# Patient Record
Sex: MALE | Race: WHITE | Employment: FULL TIME | URBAN - METROPOLITAN AREA
[De-identification: names, ages, dates, MRNs, and addresses within clinical notes are randomized per-mention and may not be internally consistent; named-entity substitution may affect disease eponyms.]

---

## 2017-03-06 ENCOUNTER — TELEPHONE (OUTPATIENT)
Dept: FAMILY MEDICINE | Facility: CLINIC | Age: 37
End: 2017-03-06

## 2017-03-06 DIAGNOSIS — F90.0 ATTENTION DEFICIT HYPERACTIVITY DISORDER (ADHD), PREDOMINANTLY INATTENTIVE TYPE: ICD-10-CM

## 2017-03-06 RX ORDER — DEXTROAMPHETAMINE SACCHARATE, AMPHETAMINE ASPARTATE, DEXTROAMPHETAMINE SULFATE AND AMPHETAMINE SULFATE 2.5; 2.5; 2.5; 2.5 MG/1; MG/1; MG/1; MG/1
10 TABLET ORAL 2 TIMES DAILY
Qty: 60 TABLET | Refills: 0 | Status: SHIPPED | OUTPATIENT
Start: 2017-03-06 | End: 2017-03-30

## 2017-03-06 NOTE — TELEPHONE ENCOUNTER
Adderall      Last Written Prescription Date:  1/15/17  Last Fill Quantity: 60,   # refills: 0  Last Office Visit with [facility], [facility] or [facility] prescribing provider: 6/8/16  Future Office visit:    Next 5 appointments (look out 90 days)     Mar 13, 2017  2:20 PM CDT   SHORT with Umesh Gruber Jr., MD   [facility] ([facility]    [address] [facility] [address] [phone]                 Routing refill request to provider for review/approval because:  Drug not on the [facility], [facility] or [facility] refill protocol or controlled substance  Joi Herndon RN, BSN  [facility]

## 2017-03-06 NOTE — TELEPHONE ENCOUNTER
The requested prescription(s) has/have been approved and has/have been printed and signed. This note was forwarded to the patient care pool to contact the patient to arrange for the patient to obtain the signed prescription(s).  Jr Umesh Gruber

## 2017-03-06 NOTE — TELEPHONE ENCOUNTER
Reason for Call:  Medication or medication refill:    Do you use a [facility]?  Name of the pharmacy and phone number for the current request:  Written Rx    Name of the medication requested: amphetamine-dextroamphetamine (ADDERALL) 10 MG tablet    Other request: Pt has an appt 3/13/17    Can we leave a detailed message on this number? YES    Phone number patient can be reached at: Home number on file [phone]    Best Time:     Call taken on 3/6/2017 at 2:24 PM by Priya Suarez

## 2017-03-30 DIAGNOSIS — F90.0 ATTENTION DEFICIT HYPERACTIVITY DISORDER (ADHD), PREDOMINANTLY INATTENTIVE TYPE: ICD-10-CM

## 2017-03-30 NOTE — TELEPHONE ENCOUNTER
Controlled Substance Refill Request for adderall--please call [phone] when ready at  for .  Problem List Complete:  Yes    Last Written Prescription Date:  3/6/2017  Last Fill Quantity: 60,   # refills: 0    Last Office Visit with [facility] primary care provider: 6/8/2016    Clinic visit frequency required: Q 6  months     Future Office visit:   Next 5 appointments (look out 90 days)     Apr 11, 2017  4:20 PM CDT   Office Visit with Umesh Gruber Jr., MD   [facility] ([facility])    [address] [id number] [phone]                  Controlled substance agreement on file: Yes:  Date 6/8/2016.     Processing:   at .   checked in past 6 months?

## 2017-03-31 NOTE — TELEPHONE ENCOUNTER
I have pended the requested refill(s).  Please have one of the other MDs print and sign the controlled substance prescription(s) in my absence.     Umesh Gruber MD

## 2017-04-03 RX ORDER — DEXTROAMPHETAMINE SACCHARATE, AMPHETAMINE ASPARTATE, DEXTROAMPHETAMINE SULFATE AND AMPHETAMINE SULFATE 2.5; 2.5; 2.5; 2.5 MG/1; MG/1; MG/1; MG/1
10 TABLET ORAL 2 TIMES DAILY
Qty: 60 TABLET | Refills: 0 | Status: SHIPPED | OUTPATIENT
Start: 2017-04-03 | End: 2017-05-08

## 2017-04-03 NOTE — TELEPHONE ENCOUNTER
Wife called   Wants to know if someone else can sign off on this.   Please call them back at [phone].   Bibi Hampton

## 2017-04-04 NOTE — TELEPHONE ENCOUNTER
Script signed per PCP's request below in his absence. Please notify available for pick-up in TC out-box.  Electronically Signed By: Perla Shipley PA-C

## 2017-04-26 ENCOUNTER — OFFICE VISIT (OUTPATIENT)
Dept: FAMILY MEDICINE | Facility: CLINIC | Age: 37
End: 2017-04-26
Payer: COMMERCIAL

## 2017-04-26 VITALS
BODY MASS INDEX: 24.8 KG/M2 | HEART RATE: 116 BPM | TEMPERATURE: 97 F | DIASTOLIC BLOOD PRESSURE: 70 MMHG | SYSTOLIC BLOOD PRESSURE: 124 MMHG | WEIGHT: 158 LBS | HEIGHT: 67 IN | OXYGEN SATURATION: 97 %

## 2017-04-26 DIAGNOSIS — T22.231A: Primary | ICD-10-CM

## 2017-04-26 PROCEDURE — 99213 OFFICE O/P EST LOW 20 MIN: CPT | Performed by: PHYSICIAN ASSISTANT

## 2017-04-26 NOTE — NURSING NOTE
"Chief Complaint   Patient presents with     Burn       Initial /70 (BP Location: Right arm, Cuff Size: Adult Regular)  Pulse 116  Temp 97  F (36.1  C) (Oral)  Ht 5' 7\" (1.702 m)  Wt 158 lb (71.7 kg)  SpO2 97%  BMI 24.75 kg/m2 Estimated body mass index is 24.75 kg/(m^2) as calculated from the following:    Height as of this encounter: 5' 7\" (1.702 m).    Weight as of this encounter: 158 lb (71.7 kg).  Medication Reconciliation: complete    "

## 2017-04-26 NOTE — TELEPHONE ENCOUNTER
I was able to reach Carolina Rx sent to [facility] RX was faxed. I spoke with Carolina and gave instructions and advised to not take additional OTC tylenol when taking this medication. Told to call us back if any further questions or concerns. Hamida Colon R.N.
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
## 2017-04-26 NOTE — TELEPHONE ENCOUNTER
Reviewed, ok to fax tylenol #3. Script signed. Please notify.  Electronically Signed By: Perla Shipley PA-C

## 2017-05-01 DIAGNOSIS — L30.1 DYSHIDROTIC ECZEMA: ICD-10-CM

## 2017-05-01 RX ORDER — CLOBETASOL PROPIONATE 0.5 MG/G
CREAM TOPICAL
Qty: 30 G | Refills: 0 | Status: SHIPPED | OUTPATIENT
Start: 2017-05-01 | End: 2017-09-06

## 2017-05-01 NOTE — TELEPHONE ENCOUNTER
clobetasol (TEMOVATE) 0.05 % cream      Last Written Prescription Date: 11/15/2016  Last Fill Quantity: 30 g,  # refills: 0   Last Office Visit with [facility], P or [facility] prescribing provider: 4/26/2017                                         Next 5 appointments (look out 90 days)     May 08, 2017  2:20 PM CDT   [facility] Visit [facility] U[facility] Jr., [facility]   [facility]    [address]  [facility] [address] [phone]

## 2017-05-08 ENCOUNTER — OFFICE VISIT (OUTPATIENT)
Dept: FAMILY MEDICINE | Facility: CLINIC | Age: 37
End: 2017-05-08
Payer: COMMERCIAL

## 2017-05-08 VITALS
BODY MASS INDEX: 25.11 KG/M2 | SYSTOLIC BLOOD PRESSURE: 112 MMHG | OXYGEN SATURATION: 99 % | HEART RATE: 90 BPM | HEIGHT: 67 IN | TEMPERATURE: 98.4 F | WEIGHT: 160 LBS | DIASTOLIC BLOOD PRESSURE: 64 MMHG

## 2017-05-08 DIAGNOSIS — F90.0 ATTENTION DEFICIT HYPERACTIVITY DISORDER (ADHD), PREDOMINANTLY INATTENTIVE TYPE: Primary | ICD-10-CM

## 2017-05-08 DIAGNOSIS — L30.1 DYSHIDROTIC ECZEMA: ICD-10-CM

## 2017-05-08 PROCEDURE — 99000 SPECIMEN HANDLING OFFICE-LAB: CPT | Performed by: FAMILY MEDICINE

## 2017-05-08 PROCEDURE — 80307 DRUG TEST PRSMV CHEM ANLYZR: CPT | Mod: 90 | Performed by: FAMILY MEDICINE

## 2017-05-08 PROCEDURE — 99213 OFFICE O/P EST LOW 20 MIN: CPT | Performed by: FAMILY MEDICINE

## 2017-05-08 RX ORDER — DEXTROAMPHETAMINE SACCHARATE, AMPHETAMINE ASPARTATE, DEXTROAMPHETAMINE SULFATE AND AMPHETAMINE SULFATE 2.5; 2.5; 2.5; 2.5 MG/1; MG/1; MG/1; MG/1
10 TABLET ORAL 2 TIMES DAILY
Qty: 60 TABLET | Refills: 0 | Status: SHIPPED | OUTPATIENT
Start: 2017-07-09 | End: 2017-07-05

## 2017-05-08 RX ORDER — DEXTROAMPHETAMINE SACCHARATE, AMPHETAMINE ASPARTATE, DEXTROAMPHETAMINE SULFATE AND AMPHETAMINE SULFATE 2.5; 2.5; 2.5; 2.5 MG/1; MG/1; MG/1; MG/1
10 TABLET ORAL 2 TIMES DAILY
Qty: 60 TABLET | Refills: 0 | Status: SHIPPED | OUTPATIENT
Start: 2017-05-08 | End: 2017-06-07

## 2017-05-08 RX ORDER — DEXTROAMPHETAMINE SACCHARATE, AMPHETAMINE ASPARTATE, DEXTROAMPHETAMINE SULFATE AND AMPHETAMINE SULFATE 2.5; 2.5; 2.5; 2.5 MG/1; MG/1; MG/1; MG/1
10 TABLET ORAL 2 TIMES DAILY
Qty: 60 TABLET | Refills: 0 | Status: SHIPPED | OUTPATIENT
Start: 2017-06-08 | End: 2017-07-08

## 2017-05-08 NOTE — NURSING NOTE
"Chief Complaint   Patient presents with     Recheck Medication       Initial /64  Pulse 110  Temp 98.4  F (36.9  C) (Tympanic)  Ht 5' 7\" (1.702 m)  Wt 160 lb (72.6 kg)  SpO2 99%  BMI 25.06 kg/m2 Estimated body mass index is 25.06 kg/(m^2) as calculated from the following:    Height as of this encounter: 5' 7\" (1.702 m).    Weight as of this encounter: 160 lb (72.6 kg).  Medication Reconciliation: complete  "

## 2017-05-08 NOTE — MR AVS SNAPSHOT
After Visit Summary   5/8/2017    Carlos Crane    MRN: [id number]           Patient Information     Date Of Birth          1980        Visit Information        Provider Department      5/8/2017 2:20 PM Umesh Gruber Jr., MD [facility]        Today's Diagnoses     Attention deficit hyperactivity disorder (ADHD), predominantly inattentive type    -  1    Dyshidrotic eczema           Follow-ups after your visit        Follow-up notes from your care team     Return in about 6 months (around 11/8/2017) for Routine Visit.      Who to contact     If you have questions or need follow up information about today's clinic visit or your schedule please contact [facility] directly at [phone].  Normal or non-critical lab and imaging results will be communicated to you by MyChart, letter or phone within 4 business days after the clinic has received the results. If you do not hear from us within 7 days, please contact the clinic through [employer] or phone. If you have a critical or abnormal lab result, we will notify you by phone as soon as possible.  Submit refill requests through [employer] or call your pharmacy and they will forward the refill request to us. Please allow 3 business days for your refill to be completed.          Additional Information About Your Visit        MyChart Information     [employer] gives you secure access to your electronic health record. If you see a primary care provider, you can also send messages to your care team and make appointments. If you have questions, please call your primary care clinic.  If you do not have a primary care provider, please call [phone] and they will assist you.        Care EveryWhere ID     This is your Care EveryWhere ID. This could be used by other organizations to access your [place] medical records  [id number]        Your Vitals Were     Pulse Temperature Height Pulse Oximetry BMI (Body Mass Index)        "90 98.4  F (36.9  C) (Tympanic) 5' 7\" (1.702 m) 99% 25.06 kg/m2        Blood Pressure from Last 3 Encounters:   05/08/17 112/64   04/26/17 124/70   06/08/16 118/66    Weight from Last 3 Encounters:   05/08/17 160 lb (72.6 kg)   04/26/17 158 lb (71.7 kg)   06/08/16 160 lb (72.6 kg)              We Performed the Following     Drug  Screen Comprehensive, Urine w/o Reported Meds (Pain Care Package)          Today's Medication Changes          These changes are accurate as of: 5/8/17  3:03 PM.  If you have any questions, ask your nurse or doctor.               These medicines have changed or have updated prescriptions.        Dose/Directions    * amphetamine-dextroamphetamine 10 MG per tablet   Commonly known as:  ADDERALL   This may have changed:  Another medication with the same name was added. Make sure you understand how and when to take each.   Used for:  Attention deficit hyperactivity disorder (ADHD), predominantly inattentive type   Changed by:  Umesh Gruber Jr., MD        Dose:  10 mg   Take 1 tablet (10 mg) by mouth 2 times daily   Quantity:  60 tablet   Refills:  0       * amphetamine-dextroamphetamine 10 MG per tablet   Commonly known as:  ADDERALL   This may have changed:  You were already taking a medication with the same name, and this prescription was added. Make sure you understand how and when to take each.   Used for:  Attention deficit hyperactivity disorder (ADHD), predominantly inattentive type   Changed by:  Umesh Gruber Jr., MD        Dose:  10 mg   Start taking on:  6/8/2017   Take 1 tablet (10 mg) by mouth 2 times daily   Quantity:  60 tablet   Refills:  0       * amphetamine-dextroamphetamine 10 MG per tablet   Commonly known as:  ADDERALL   This may have changed:  You were already taking a medication with the same name, and this prescription was added. Make sure you understand how and when to take each.   Used for:  Attention deficit hyperactivity disorder (ADHD), predominantly " inattentive type   Changed by:  Umesh Gruber Jr., MD        Dose:  10 mg   Start taking on:  7/9/2017   Take 1 tablet (10 mg) by mouth 2 times daily   Quantity:  60 tablet   Refills:  0       * Notice:  This list has 3 medication(s) that are the same as other medications prescribed for you. Read the directions carefully, and ask your doctor or other care provider to review them with you.         Where to get your medicines      Some of these will need a paper prescription and others can be bought over the counter.  Ask your nurse if you have questions.     Bring a paper prescription for each of these medications     amphetamine-dextroamphetamine 10 MG per tablet    amphetamine-dextroamphetamine 10 MG per tablet    amphetamine-dextroamphetamine 10 MG per tablet                Primary Care Provider Office Phone # Fax #    Umesh Gruber Jr., [phone]       [facility] [address] [facility] [address]        Thank you!     Thank you for choosing [facility]  for your care. Our goal is always to provide you with excellent care. Hearing back from our patients is one way we can continue to improve our services. Please take a few minutes to complete the written survey that you may receive in the mail after your visit with us. Thank you!             Your Updated Medication List - Protect others around you: Learn how to safely use, store and throw away your medicines at www.disposemymeds.org.          This list is accurate as of: 5/8/17  3:03 PM.  Always use your most recent med list.                   Brand Name Dispense Instructions for use    acetaminophen-codeine 300-30 MG per tablet    TYLENOL #3    18 tablet    Take 1-2 tablets by mouth every 6 hours as needed for pain       * amphetamine-dextroamphetamine 10 MG per tablet    ADDERALL    60 tablet    Take 1 tablet (10 mg) by mouth 2 times daily       * amphetamine-dextroamphetamine 10 MG per tablet   Start taking  on:  6/8/2017    ADDERALL    60 tablet    Take 1 tablet (10 mg) by mouth 2 times daily       * amphetamine-dextroamphetamine 10 MG per tablet   Start taking on:  7/9/2017    ADDERALL    60 tablet    Take 1 tablet (10 mg) by mouth 2 times daily       clobetasol 0.05 % cream    TEMOVATE    30 g    Apply sparingly to affected area twice daily for 14 days.  Do not apply to face.       fluticasone 50 MCG/ACT spray    FLONASE    1 Package    Spray 1-2 sprays into both nostrils daily       montelukast 10 MG tablet    SINGULAIR    90 tablet    Take 1 tablet (10 mg) by mouth At Bedtime       * Notice:  This list has 3 medication(s) that are the same as other medications prescribed for you. Read the directions carefully, and ask your doctor or other care provider to review them with you.
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
## 2017-05-12 LAB — COMPREHEN DRUG ANALYSIS UR: NORMAL

## 2017-05-15 NOTE — PROGRESS NOTES
Mr. Crane,    -All of your labs are normal.  Drug screen appropriately positive for your stimulant medication, V.      If you have further questions about the interpretation of your labs, labtestsonline.org is a good website to check out for further information.    Please contact the clinic if you have additional questions.  Thank you.    Sincerely,    Umesh Gruber MD

## 2017-07-05 ENCOUNTER — OFFICE VISIT (OUTPATIENT)
Dept: FAMILY MEDICINE | Facility: CLINIC | Age: 37
End: 2017-07-05
Payer: COMMERCIAL

## 2017-07-05 VITALS
BODY MASS INDEX: 24.64 KG/M2 | TEMPERATURE: 98.3 F | DIASTOLIC BLOOD PRESSURE: 62 MMHG | OXYGEN SATURATION: 100 % | HEART RATE: 88 BPM | WEIGHT: 157 LBS | SYSTOLIC BLOOD PRESSURE: 118 MMHG | HEIGHT: 67 IN

## 2017-07-05 DIAGNOSIS — Z00.00 ENCOUNTER FOR ROUTINE ADULT HEALTH EXAMINATION WITHOUT ABNORMAL FINDINGS: Primary | ICD-10-CM

## 2017-07-05 DIAGNOSIS — Z11.1 SCREENING EXAMINATION FOR PULMONARY TUBERCULOSIS: ICD-10-CM

## 2017-07-05 DIAGNOSIS — Z13.1 SCREENING FOR DIABETES MELLITUS: ICD-10-CM

## 2017-07-05 DIAGNOSIS — Z13.6 CARDIOVASCULAR SCREENING; LDL GOAL LESS THAN 160: ICD-10-CM

## 2017-07-05 LAB
CHOLEST SERPL-MCNC: 175 MG/DL
GLUCOSE SERPL-MCNC: 113 MG/DL (ref 70–99)
HDLC SERPL-MCNC: 58 MG/DL
LDLC SERPL CALC-MCNC: 105 MG/DL
NONHDLC SERPL-MCNC: 117 MG/DL
TRIGL SERPL-MCNC: 59 MG/DL

## 2017-07-05 PROCEDURE — 80061 LIPID PANEL: CPT | Performed by: PHYSICIAN ASSISTANT

## 2017-07-05 PROCEDURE — 36415 COLL VENOUS BLD VENIPUNCTURE: CPT | Performed by: PHYSICIAN ASSISTANT

## 2017-07-05 PROCEDURE — 99395 PREV VISIT EST AGE 18-39: CPT | Performed by: PHYSICIAN ASSISTANT

## 2017-07-05 PROCEDURE — 82947 ASSAY GLUCOSE BLOOD QUANT: CPT | Performed by: PHYSICIAN ASSISTANT

## 2017-07-05 PROCEDURE — 86480 TB TEST CELL IMMUN MEASURE: CPT | Performed by: PHYSICIAN ASSISTANT

## 2017-07-05 NOTE — PROGRESS NOTES
SUBJECTIVE:   CC: Carlos Crane is an 37 year old male who presents for preventative health visit.     Healthy Habits:    Do you get at least three servings of calcium containing foods daily (dairy, green leafy vegetables, etc.)? No - pt to consider starting a multivitamin    Amount of exercise or daily activities, outside of work: 0 day(s) per week - has some activity, but normally going to the gym.     Problems taking medications regularly No    Medication side effects: No    Have you had an eye exam in the past two years? no    Do you see a dentist twice per year? yes    Do you have sleep apnea, excessive snoring or daytime drowsiness? no      Forms completed for [facility] RN program. Believes has copy of immunization records at home. If not, then will return for titer testing.  Believes he also had a + TST for TB, but he did receive the BCG vaccine so isn't sure if it was from this.  No TB gold testing and is amenable to this.     VISION   No corrective lenses  Tool used: BELL   Right eye:        20/40  Left eye:          20/40  Visual Acuity: REFER - pt encouraged to schedule eye exam.  H Plus Lens Screening: Pass  Color vision screening: Pass      HEARING FREQUENCY:   Right Ear:  500 Hz: 20 db HL   1000 Hz: 20 db HL   2000 Hz: 20 db HL   4000 Hz: 20 db HL   6000 Hz: 20 db HL  Left Ear:  500 Hz: 20 db HL   1000 Hz: 20 db HL   2000 Hz: 20 db HL   4000 Hz: 20 db HL   6000 Hz: 20 db HL        Today's PHQ-2 Score:   PHQ-2 ( 1999 Pfizer) 7/5/2017 6/8/2016   Q1: Little interest or pleasure in doing things 0 0   Q2: Feeling down, depressed or hopeless 0 0   PHQ-2 Score 0 0       Abuse: Current or Past(Physical, Sexual or Emotional)- No  Do you feel safe in your environment - Yes    Social History   Substance Use Topics     Smoking status: Never Smoker     Smokeless tobacco: Never Used     Alcohol use Yes     The patient does not drink >3 drinks per day nor >7 drinks per week.    Last PSA: No results  found for: PSA    Reviewed orders with patient. Reviewed health maintenance and updated orders accordingly - Yes  BP Readings from Last 3 Encounters:   07/05/17 118/62   05/08/17 112/64   04/26/17 124/70    Wt Readings from Last 3 Encounters:   07/05/17 157 lb (71.2 kg)   05/08/17 160 lb (72.6 kg)   04/26/17 158 lb (71.7 kg)                  Patient Active Problem List   Diagnosis     CARDIOVASCULAR SCREENING; LDL GOAL LESS THAN 160     Attention deficit hyperactivity disorder (ADHD), predominantly inattentive type     Environmental allergies     Dyshidrotic eczema     Past Surgical History:   Procedure Laterality Date     NO HISTORY OF SURGERY         Social History   Substance Use Topics     Smoking status: Never Smoker     Smokeless tobacco: Never Used     Alcohol use Yes     Family History   Problem Relation Age of Onset     Hypertension Mother      DIABETES Mother      type 2     CEREBROVASCULAR DISEASE Mother 60     non-smoker     Coronary Artery Disease Father 60     MI, non-smoker     Breast Cancer No family hx of      Colon Cancer No family hx of      Genetic Disorder No family hx of      Thyroid Disease No family hx of      MENTAL ILLNESS No family hx of          Current Outpatient Prescriptions   Medication Sig Dispense Refill     amphetamine-dextroamphetamine (ADDERALL) 10 MG per tablet Take 1 tablet (10 mg) by mouth 2 times daily 60 tablet 0     montelukast (SINGULAIR) 10 MG tablet Take 1 tablet (10 mg) by mouth At Bedtime 90 tablet 0     fluticasone (FLONASE) 50 MCG/ACT nasal spray Spray 1-2 sprays into both nostrils daily 1 Package 11     clobetasol (TEMOVATE) 0.05 % cream Apply sparingly to affected area twice daily for 14 days.  Do not apply to face. 30 g 0     No Known Allergies    Reviewed and updated as needed this visit by clinical staff  Tobacco  Allergies  Meds  Med Hx  Surg Hx  Fam Hx  Soc Hx        Reviewed and updated as needed this visit by Provider  Tobacco  Allergies  Meds  Med  "Hx  Surg Hx  Fam Hx  Soc Hx           ROS:  C: NEGATIVE for fever, chills, change in weight  I: NEGATIVE for worrisome rashes, moles or lesions  E: NEGATIVE for vision changes or irritation  ENT: NEGATIVE for ear, mouth and throat problems  R: NEGATIVE for significant cough or SOB  CV: NEGATIVE for chest pain, palpitations or peripheral edema  GI: NEGATIVE for nausea, abdominal pain, heartburn, or change in bowel habits   male: negative for dysuria, hematuria, decreased urinary stream, erectile dysfunction, urethral discharge  M: NEGATIVE for significant arthralgias or myalgia  N: NEGATIVE for weakness, dizziness or paresthesias  P: NEGATIVE for changes in mood or affect    OBJECTIVE:   /62  Pulse 88  Temp 98.3  F (36.8  C) (Oral)  Ht 5' 7\" (1.702 m)  Wt 157 lb (71.2 kg)  SpO2 100%  BMI 24.59 kg/m2  EXAM:  GENERAL: healthy, alert and no distress  EYES: Eyes grossly normal to inspection, PERRL and conjunctivae and sclerae normal  HENT: ear canals and TM's normal, nose and mouth without ulcers or lesions  NECK: no adenopathy, no asymmetry, masses, or scars and thyroid normal to palpation  RESP: lungs clear to auscultation - no rales, rhonchi or wheezes  CV: regular rate and rhythm, normal S1 S2, no S3 or S4, no murmur, click or rub, no peripheral edema and peripheral pulses strong  ABDOMEN: soft, nontender, no hepatosplenomegaly, no masses and bowel sounds normal  MS: no gross musculoskeletal defects noted, no edema  SKIN: no suspicious lesions or rashes  NEURO: Normal strength and tone, mentation intact and speech normal  PSYCH: mentation appears normal, affect normal/bright    ASSESSMENT/PLAN:       ICD-10-CM    1. Encounter for routine adult health examination without abnormal findings Z00.00    2. Screening examination for pulmonary tuberculosis Z11.1 M Tuberculosis by Quantiferon   3. CARDIOVASCULAR SCREENING; LDL GOAL LESS THAN 160 Z13.6 Lipid Profile with reflex to direct LDL   4. Screening for " "diabetes mellitus Z13.1 Glucose   Forms completed for RN program.  Pt to follow-up for further eye evaluation.  If cannot find prior immunization records he will RTC for titers.    COUNSELING:  Reviewed preventive health counseling, as reflected in patient instructions       Regular exercise       Healthy diet/nutrition       Vision screening       reports that he has never smoked. He has never used smokeless tobacco.    Estimated body mass index is 24.59 kg/(m^2) as calculated from the following:    Height as of this encounter: 5' 7\" (1.702 m).    Weight as of this encounter: 157 lb (71.2 kg).       Counseling Resources:  ATP IV Guidelines  Pooled Cohorts Equation Calculator  FRAX Risk Assessment  ICSI Preventive Guidelines  Dietary Guidelines for Americans, 2010  USDA's MyPlate  ASA Prophylaxis  Lung CA Screening    Perla Shipley PA-C  [facility]  "

## 2017-07-05 NOTE — MR AVS SNAPSHOT
After Visit Summary   7/5/2017    Carlos Crane    MRN: [id number]           Patient Information     Date Of Birth          1980        Visit Information        Provider Department      7/5/2017 10:00 AM Torkilsen, Perla Ailyn, PA-C [facility]        Today's Diagnoses     Screening examination for pulmonary tuberculosis    -  1    CARDIOVASCULAR SCREENING; LDL GOAL LESS THAN 160        Screening for diabetes mellitus          Care Instructions      Preventive Health Recommendations  Male Ages 26 - 39    Yearly exam:             See your health care provider every year in order to  o   Review health changes.   o   Discuss preventive care.    o   Review your medicines if your doctor has prescribed any.    You should be tested each year for STDs (sexually transmitted diseases), if you re at risk.     After age 35, talk to your provider about cholesterol testing. If you are at risk for heart disease, have your cholesterol tested at least every 5 years.     If you are at risk for diabetes, you should have a diabetes test (fasting glucose).  Shots: Get a flu shot each year. Get a tetanus shot every 10 years.     Nutrition:    Eat at least 5 servings of fruits and vegetables daily.     Eat whole-grain bread, whole-wheat pasta and brown rice instead of white grains and rice.     Talk to your provider about Calcium and Vitamin D.     Lifestyle    Exercise for at least 150 minutes a week (30 minutes a day, 5 days a week). This will help you control your weight and prevent disease.     Limit alcohol to one drink per day.     No smoking.     Wear sunscreen to prevent skin cancer.     See your dentist every six months for an exam and cleaning.             Follow-ups after your visit        Who to contact     If you have questions or need follow up information about today's clinic visit or your schedule please contact [facility] directly at [phone].  Normal or  "non-critical lab and imaging results will be communicated to you by MyChart, letter or phone within 4 business days after the clinic has received the results. If you do not hear from us within 7 days, please contact the clinic through [employer] or phone. If you have a critical or abnormal lab result, we will notify you by phone as soon as possible.  Submit refill requests through [employer] or call your pharmacy and they will forward the refill request to us. Please allow 3 business days for your refill to be completed.          Additional Information About Your Visit        [employer] Information     [employer] gives you secure access to your electronic health record. If you see a primary care provider, you can also send messages to your care team and make appointments. If you have questions, please call your primary care clinic.  If you do not have a primary care provider, please call [phone] and they will assist you.        Care EveryWhere ID     This is your Care EveryWhere ID. This could be used by other organizations to access your [place] medical records  [id number]        Your Vitals Were     Pulse Temperature Height Pulse Oximetry BMI (Body Mass Index)       88 98.3  F (36.8  C) (Oral) 5' 7\" (1.702 m) 100% 24.59 kg/m2        Blood Pressure from Last 3 Encounters:   07/05/17 118/62   05/08/17 112/64   04/26/17 124/70    Weight from Last 3 Encounters:   07/05/17 157 lb (71.2 kg)   05/08/17 160 lb (72.6 kg)   04/26/17 158 lb (71.7 kg)              We Performed the Following     Glucose     Lipid Profile with reflex to direct LDL     M Tuberculosis by Quantiferon          Today's Medication Changes          These changes are accurate as of: 7/5/17 10:49 AM.  If you have any questions, ask your nurse or doctor.               These medicines have changed or have updated prescriptions.        Dose/Directions    amphetamine-dextroamphetamine 10 MG per tablet   Commonly known as:  ADDERALL   This may have changed:  " Another medication with the same name was removed. Continue taking this medication, and follow the directions you see here.   Used for:  Attention deficit hyperactivity disorder (ADHD), predominantly inattentive type   Changed by:  Umesh Gruber Jr., MD        Dose:  10 mg   Take 1 tablet (10 mg) by mouth 2 times daily   Quantity:  60 tablet   Refills:  0         Stop taking these medicines if you haven't already. Please contact your care team if you have questions.     acetaminophen-codeine 300-30 MG per tablet   Commonly known as:  TYLENOL #3   Stopped by:  Perla Shipley PA-C                    Primary Care Provider Office Phone # Fax #    Umesh Gruber Jr., [phone]       [facility] [address] [facility] [address]        Equal Access to Services     WINNIE HADLEY AH: Hadii aad ku hadasho Soomaali, waaxda luqadaha, qaybta kaalmada adeegyada, waxay idiin hayaan cindy coleman . So [facility] [phone].    ATENCIÓN: Si habla español, tiene a patton disposición servicios gratuitos de asistencia lingüística. Llame al [phone].    We comply with applicable federal civil rights laws and [place] laws. We do not discriminate on the basis of race, color, national origin, age, disability sex, sexual orientation or gender identity.            Thank you!     Thank you for choosing [facility]  for your care. Our goal is always to provide you with excellent care. Hearing back from our patients is one way we can continue to improve our services. Please take a few minutes to complete the written survey that you may receive in the mail after your visit with us. Thank you!             Your Updated Medication List - Protect others around you: Learn how to safely use, store and throw away your medicines at www.disposemymeds.org.          This list is accurate as of: 7/5/17 10:49 AM.  Always use your most recent med list.                   Brand Name Dispense  Instructions for use Diagnosis    amphetamine-dextroamphetamine 10 MG per tablet    ADDERALL    60 tablet    Take 1 tablet (10 mg) by mouth 2 times daily    Attention deficit hyperactivity disorder (ADHD), predominantly inattentive type       clobetasol 0.05 % cream    TEMOVATE    30 g    Apply sparingly to affected area twice daily for 14 days.  Do not apply to face.    Dyshidrotic eczema       fluticasone 50 MCG/ACT spray    FLONASE    1 Package    Spray 1-2 sprays into both nostrils daily    Chronic rhinitis       montelukast 10 MG tablet    SINGULAIR    90 tablet    Take 1 tablet (10 mg) by mouth At Bedtime    Seasonal allergies

## 2017-07-05 NOTE — NURSING NOTE
"Chief Complaint   Patient presents with     Physical       Initial Pulse 114  Temp 98.3  F (36.8  C) (Oral)  Ht 5' 7\" (1.702 m)  Wt 157 lb (71.2 kg)  SpO2 100%  BMI 24.59 kg/m2 Estimated body mass index is 24.59 kg/(m^2) as calculated from the following:    Height as of this encounter: 5' 7\" (1.702 m).    Weight as of this encounter: 157 lb (71.2 kg).  Medication Reconciliation: complete    "

## 2017-07-06 LAB
M TB TUBERC IFN-G BLD QL: NEGATIVE
M TB TUBERC IFN-G/MITOGEN IGNF BLD: 0.04 IU/ML

## 2017-07-11 NOTE — PROGRESS NOTES
Please call or write patient with the following results:    -LDL(bad) cholesterol level is minimally elevated,  A diet high in fat and simple carbohydrates, genetics and being overweight can contribute to this. ADVISE: Exercise, a low fat, low carbohydrate diet and weight control are helpful to improve this.  Rechecking your fasting cholesterol panel in 12 months is recommended (Lipid w/ LDL reflex, DX:hyperlipidemia)  -Glucose is slight elevated, but this was likely due to being a not totally fasting test.  ADVISE:: low carbohydrate diet, exercise, try to lose weight (if necessary) and recheck glucose in 3 months. (GLU,A1C, DX: prediabetes)  - TB screening test was normal/negative.    Electronically Signed By: Perla Shipley PA-C

## 2017-08-01 ENCOUNTER — TELEPHONE (OUTPATIENT)
Dept: FAMILY MEDICINE | Facility: CLINIC | Age: 37
End: 2017-08-01

## 2017-08-01 DIAGNOSIS — F90.0 ATTENTION DEFICIT HYPERACTIVITY DISORDER (ADHD), PREDOMINANTLY INATTENTIVE TYPE: Primary | ICD-10-CM

## 2017-08-01 RX ORDER — DEXTROAMPHETAMINE SACCHARATE, AMPHETAMINE ASPARTATE, DEXTROAMPHETAMINE SULFATE AND AMPHETAMINE SULFATE 2.5; 2.5; 2.5; 2.5 MG/1; MG/1; MG/1; MG/1
10 TABLET ORAL 2 TIMES DAILY
Qty: 60 TABLET | Refills: 0 | Status: SHIPPED | OUTPATIENT
Start: 2017-09-01 | End: 2017-10-01

## 2017-08-01 RX ORDER — DEXTROAMPHETAMINE SACCHARATE, AMPHETAMINE ASPARTATE, DEXTROAMPHETAMINE SULFATE AND AMPHETAMINE SULFATE 2.5; 2.5; 2.5; 2.5 MG/1; MG/1; MG/1; MG/1
10 TABLET ORAL 2 TIMES DAILY
Qty: 60 TABLET | Refills: 0 | Status: SHIPPED | OUTPATIENT
Start: 2017-08-01 | End: 2017-08-31

## 2017-08-01 RX ORDER — DEXTROAMPHETAMINE SACCHARATE, AMPHETAMINE ASPARTATE, DEXTROAMPHETAMINE SULFATE AND AMPHETAMINE SULFATE 2.5; 2.5; 2.5; 2.5 MG/1; MG/1; MG/1; MG/1
10 TABLET ORAL 2 TIMES DAILY
Qty: 60 TABLET | Refills: 0 | Status: SHIPPED | OUTPATIENT
Start: 2017-10-02 | End: 2017-11-01

## 2017-08-01 NOTE — TELEPHONE ENCOUNTER
The requested prescription(s) has/have been approved and has/have been printed and signed. This note was forwarded to the patient care pool to contact the patient to arrange for the patient to obtain the signed prescription(s).  Umesh Gruber Jr

## 2017-08-01 NOTE — TELEPHONE ENCOUNTER
Controlled Substance Refill Request for amphetamine-dextroamphetamine (ADDERALL) 10 MG per tablet  3 month   Last Written Prescription Date:  7/9/17  Last Fill Quantity: 60,   # refills: 0  Last Office Visit with [facility] primary care provider: 7/5/17 physical    Future Office visit:   Next 5 appointments (look out 90 days)     Aug 03, 2017 10:00 AM CDT   Office Visit with Joyce Viveros PA-C   [facility]    [address] [place] [id number] [phone]                  Routing refill request to provider for review/approval because:  Drug not on the [facility], [facility] or [facility] refill protocol or controlled substance

## 2017-08-01 NOTE — TELEPHONE ENCOUNTER
Reason for Call:  Medication or medication refill:    Do you use a [facility]?  Name of the pharmacy and phone number for the current request:  Written    Name of the medication requested: amphetamine-dextroamphetamine (ADDERALL) 10 MG per tablet    Other request:     Can we leave a detailed message on this number? YES    Phone number patient can be reached at: Home number on file [phone] (home)    Best Time:     Call taken on 8/1/2017 at 4:10 PM by Priya Suarez

## 2017-09-06 ENCOUNTER — OFFICE VISIT (OUTPATIENT)
Dept: FAMILY MEDICINE | Facility: CLINIC | Age: 37
End: 2017-09-06
Payer: COMMERCIAL

## 2017-09-06 VITALS
HEART RATE: 70 BPM | TEMPERATURE: 98.6 F | BODY MASS INDEX: 24.64 KG/M2 | HEIGHT: 67 IN | OXYGEN SATURATION: 99 % | SYSTOLIC BLOOD PRESSURE: 118 MMHG | DIASTOLIC BLOOD PRESSURE: 62 MMHG | WEIGHT: 157 LBS

## 2017-09-06 DIAGNOSIS — H04.123 DRY EYES: ICD-10-CM

## 2017-09-06 DIAGNOSIS — Z01.84 IMMUNITY STATUS TESTING: Primary | ICD-10-CM

## 2017-09-06 DIAGNOSIS — F90.0 ATTENTION DEFICIT HYPERACTIVITY DISORDER (ADHD), PREDOMINANTLY INATTENTIVE TYPE: ICD-10-CM

## 2017-09-06 DIAGNOSIS — L30.1 DYSHIDROTIC ECZEMA: ICD-10-CM

## 2017-09-06 PROCEDURE — 86787 VARICELLA-ZOSTER ANTIBODY: CPT | Performed by: FAMILY MEDICINE

## 2017-09-06 PROCEDURE — 36415 COLL VENOUS BLD VENIPUNCTURE: CPT | Performed by: FAMILY MEDICINE

## 2017-09-06 PROCEDURE — 86765 RUBEOLA ANTIBODY: CPT | Performed by: FAMILY MEDICINE

## 2017-09-06 PROCEDURE — 99214 OFFICE O/P EST MOD 30 MIN: CPT | Performed by: FAMILY MEDICINE

## 2017-09-06 PROCEDURE — 86762 RUBELLA ANTIBODY: CPT | Performed by: FAMILY MEDICINE

## 2017-09-06 PROCEDURE — 86706 HEP B SURFACE ANTIBODY: CPT | Performed by: FAMILY MEDICINE

## 2017-09-06 PROCEDURE — 86735 MUMPS ANTIBODY: CPT | Performed by: FAMILY MEDICINE

## 2017-09-06 RX ORDER — CLOBETASOL PROPIONATE 0.5 MG/G
CREAM TOPICAL
Qty: 30 G | Refills: 0 | Status: SHIPPED | OUTPATIENT
Start: 2017-09-06 | End: 2018-05-07

## 2017-09-06 NOTE — PROGRESS NOTES
"  SUBJECTIVE:   Carlos Crane is a 37 year old male who presents to clinic today for the following health issues:      Needs titers for school.  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  Needs titers checked.  Had a negative quanterferon gold test for TB earlier this year.    ADHD remains well controlled with Adderall.  Denies tics, headache, abdominal pain, nausea, insomnia, tremor.    Reports chronic dry eyes that seems to be worse recently given seasonal allergies.  Is taking antihistamines for this and feels like this is contributing to his dry eyes.    Finally, needs refill of his high potency steroid cream for his dyshidrotic eczema.    Problem list and histories reviewed & adjusted, as indicated.  Additional history: as documented    BP Readings from Last 3 Encounters:   09/06/17 118/62   07/05/17 118/62   05/08/17 112/64    Wt Readings from Last 3 Encounters:   09/06/17 157 lb (71.2 kg)   07/05/17 157 lb (71.2 kg)   05/08/17 160 lb (72.6 kg)                  Labs reviewed in EPIC      Reviewed and updated as needed this visit by clinical staff       Reviewed and updated as needed this visit by Provider         ROS:  Constitutional, HEENT, cardiovascular, pulmonary, gi and gu systems are negative, except as otherwise noted.      OBJECTIVE:   /62  Pulse 70  Temp 98.6  F (37  C) (Oral)  Ht 5' 7\" (1.702 m)  Wt 157 lb (71.2 kg)  SpO2 99%  BMI 24.59 kg/m2  Body mass index is 24.59 kg/(m^2).  GENERAL: healthy, alert and no distress  NECK: no adenopathy, no asymmetry, masses, or scars and thyroid normal to palpation  RESP: lungs clear to auscultation - no rales, rhonchi or wheezes  CV: regular rate and rhythm, normal S1 S2, no S3 or S4, no murmur, click or rub, no peripheral edema and peripheral pulses strong  PSYCH: mentation appears normal, affect normal/bright    Diagnostic Test Results:  No results found for this or any previous visit (from the past 24 " hour(s)).    ASSESSMENT/PLAN:             1. Immunity status testing  Will test as below.  Will need immunizations updated if he does not demonstrate immunity to any of those outlined below.  - Varicella Zoster Virus Antibody IgG  - Rubella Antibody IgG Quantitative  - Rubeola Antibody IgG  - Mumps Antibody IgG  - Hepatitis B Surface Antibody    2. Attention deficit hyperactivity disorder (ADHD), predominantly inattentive type  Doing well on Adderall.  Follow up in clinic in six months for next face-to-face visit for this.    3. Dry eyes  Recommended REFRESH eye drops to start with.  If this continues, recommended he see an Optometry or Ophthalmology provider.    4. Dyshidrotic eczema  Refill as below.  - clobetasol (TEMOVATE) 0.05 % cream; Apply sparingly to affected area twice daily for 14 days.  Do not apply to face.  Dispense: 30 g; Refill: 0    See Patient Instructions    Umesh Gruber Jr, MD  [facility] [place]

## 2017-09-06 NOTE — MR AVS SNAPSHOT
"              After Visit Summary   9/6/2017    Carlos Crane    MRN: [id number]           Patient Information     Date Of Birth          1980        Visit Information        Provider Department      9/6/2017 2:00 PM Umesh Gruber Jr., MD [facility]        Today's Diagnoses     Immunity status testing    -  1       Follow-ups after your visit        Follow-up notes from your care team     Return if symptoms worsen or fail to improve.      Who to contact     If you have questions or need follow up information about today's clinic visit or your schedule please contact [facility] directly at [phone].  Normal or non-critical lab and imaging results will be communicated to you by MyChart, letter or phone within 4 business days after the clinic has received the results. If you do not hear from us within 7 days, please contact the clinic through BEZ Systemshart or phone. If you have a critical or abnormal lab result, we will notify you by phone as soon as possible.  Submit refill requests through APERA BAGS or call your pharmacy and they will forward the refill request to us. Please allow 3 business days for your refill to be completed.          Additional Information About Your Visit        MyChart Information     APERA BAGS gives you secure access to your electronic health record. If you see a primary care provider, you can also send messages to your care team and make appointments. If you have questions, please call your primary care clinic.  If you do not have a primary care provider, please call [phone] and they will assist you.        Care EveryWhere ID     This is your Care EveryWhere ID. This could be used by other organizations to access your [facility] medical records  [id number]        Your Vitals Were     Pulse Temperature Height Pulse Oximetry BMI (Body Mass Index)       106 98.6  F (37  C) (Oral) 5' 7\" (1.702 m) 99% 24.59 kg/m2        Blood Pressure from Last 3 " Encounters:   09/06/17 118/62   07/05/17 118/62   05/08/17 112/64    Weight from Last 3 Encounters:   09/06/17 157 lb (71.2 kg)   07/05/17 157 lb (71.2 kg)   05/08/17 160 lb (72.6 kg)              We Performed the Following     Hepatitis B surface barrington immune s     Mumps Antibody IgG     Rubella Antibody IgG Quantitative     Rubeola Antibody IgG     Varicella Zoster Virus Antibody IgG        Primary Care Provider Office Phone # Fax #    Umesh Gruber Jr., [phone] [address]  [place] [address]        Equal Access to Services     [facility]: Hadii aad ku hadasho Soomaali, waaxda luqadaha, qaybta kaalmada adeegyada, maryjane akhtar haymeryn cindy coleman . So [facility] [phone].    ATENCIÓN: Si habla español, tiene a patton disposición servicios gratuitos de asistencia lingüística. Llame al [phone].    We comply with applicable federal civil rights laws and [place] laws. We do not discriminate on the basis of race, color, national origin, age, disability sex, sexual orientation or gender identity.            Thank you!     Thank you for choosing [facility]  for your care. Our goal is always to provide you with excellent care. Hearing back from our patients is one way we can continue to improve our services. Please take a few minutes to complete the written survey that you may receive in the mail after your visit with us. Thank you!             Your Updated Medication List - Protect others around you: Learn how to safely use, store and throw away your medicines at www.disposemymeds.org.          This list is accurate as of: 9/6/17  2:59 PM.  Always use your most recent med list.                   Brand Name Dispense Instructions for use Diagnosis    * amphetamine-dextroamphetamine 10 MG per tablet    ADDERALL    60 tablet    Take 1 tablet (10 mg) by mouth 2 times daily    Attention deficit hyperactivity disorder (ADHD), predominantly inattentive type       *  amphetamine-dextroamphetamine 10 MG per tablet   Start taking on:  10/2/2017    ADDERALL    60 tablet    Take 1 tablet (10 mg) by mouth 2 times daily    Attention deficit hyperactivity disorder (ADHD), predominantly inattentive type       clobetasol 0.05 % cream    TEMOVATE    30 g    Apply sparingly to affected area twice daily for 14 days.  Do not apply to face.    Dyshidrotic eczema       fluticasone 50 MCG/ACT spray    FLONASE    1 Package    Spray 1-2 sprays into both nostrils daily    Chronic rhinitis       montelukast 10 MG tablet    SINGULAIR    90 tablet    Take 1 tablet (10 mg) by mouth At Bedtime    Seasonal allergies       * Notice:  This list has 2 medication(s) that are the same as other medications prescribed for you. Read the directions carefully, and ask your doctor or other care provider to review them with you.

## 2017-09-06 NOTE — NURSING NOTE
"Chief Complaint   Patient presents with     Form Request     labs for school        Initial /62  Pulse 106  Temp 98.6  F (37  C) (Oral)  Ht 5' 7\" (1.702 m)  Wt 157 lb (71.2 kg)  SpO2 99%  BMI 24.59 kg/m2 Estimated body mass index is 24.59 kg/(m^2) as calculated from the following:    Height as of this encounter: 5' 7\" (1.702 m).    Weight as of this encounter: 157 lb (71.2 kg).  Medication Reconciliation: complete  "

## 2017-09-07 LAB — HBV SURFACE AB SERPL IA-ACNC: 663.69 M[IU]/ML

## 2017-09-08 LAB
MEV IGG SER QL IA: 1.5 AI (ref 0–0.8)
MUV IGG SER QL IA: 2.9 AI (ref 0–0.8)
RUBV IGG SERPL IA-ACNC: 57 IU/ML
VZV IGG SER QL IA: 3.9 AI (ref 0–0.8)

## 2017-09-08 NOTE — PROGRESS NOTES
Mr. Crane,    -All of your labs are normal.  Your testing shows you are immune to measles, mumps, rubella, hepatitis B and chicken pox/varicella.    If you have further questions about the interpretation of your labs, labtestsonline.org is a good website to check out for further information.    Please contact the clinic if you have additional questions.  Thank you.    Sincerely,    Umesh Gruber MD

## 2017-10-31 DIAGNOSIS — F90.0 ATTENTION DEFICIT HYPERACTIVITY DISORDER (ADHD), PREDOMINANTLY INATTENTIVE TYPE: ICD-10-CM

## 2017-10-31 RX ORDER — DEXTROAMPHETAMINE SACCHARATE, AMPHETAMINE ASPARTATE, DEXTROAMPHETAMINE SULFATE AND AMPHETAMINE SULFATE 2.5; 2.5; 2.5; 2.5 MG/1; MG/1; MG/1; MG/1
10 TABLET ORAL 2 TIMES DAILY
Qty: 60 TABLET | Refills: 0 | Status: SHIPPED | OUTPATIENT
Start: 2017-12-01 | End: 2017-12-31

## 2017-10-31 RX ORDER — DEXTROAMPHETAMINE SACCHARATE, AMPHETAMINE ASPARTATE, DEXTROAMPHETAMINE SULFATE AND AMPHETAMINE SULFATE 2.5; 2.5; 2.5; 2.5 MG/1; MG/1; MG/1; MG/1
10 TABLET ORAL 2 TIMES DAILY
Qty: 60 TABLET | Refills: 0 | Status: SHIPPED | OUTPATIENT
Start: 2018-01-01 | End: 2018-02-02

## 2017-10-31 RX ORDER — DEXTROAMPHETAMINE SACCHARATE, AMPHETAMINE ASPARTATE, DEXTROAMPHETAMINE SULFATE AND AMPHETAMINE SULFATE 2.5; 2.5; 2.5; 2.5 MG/1; MG/1; MG/1; MG/1
10 TABLET ORAL 2 TIMES DAILY
Qty: 60 TABLET | Refills: 0 | Status: SHIPPED | OUTPATIENT
Start: 2017-10-31 | End: 2017-11-30

## 2017-10-31 RX ORDER — DEXTROAMPHETAMINE SACCHARATE, AMPHETAMINE ASPARTATE, DEXTROAMPHETAMINE SULFATE AND AMPHETAMINE SULFATE 2.5; 2.5; 2.5; 2.5 MG/1; MG/1; MG/1; MG/1
10 TABLET ORAL 2 TIMES DAILY
Qty: 60 TABLET | Refills: 0 | Status: CANCELLED | OUTPATIENT
Start: 2017-10-31

## 2017-10-31 NOTE — TELEPHONE ENCOUNTER
Adderall--ok for wife Kasandra to  if pt cannot come    Can be reached at [phone] when done.  OK to leave detailed message.  Also asking to have copy of labs from 9/2017 placed in same envelope so he can turn them in to school.  Last Written Prescription Date:  10/2/2017  Last Fill Quantity: 60,   # refills: 0  Future Office visit:       Routing refill request to provider for review/approval because:  Drug not on the [facility], [facility] or [facility] refill protocol or controlled substance

## 2017-10-31 NOTE — TELEPHONE ENCOUNTER
The requested prescription(s) has/have been approved and has/have been printed and signed. This note was forwarded to the patient care pool to contact the patient to arrange for the patient to obtain the signed prescription(s).  Copy of recent labs also included.  Umesh Gruber Jr

## 2017-11-01 NOTE — TELEPHONE ENCOUNTER
Called number below and left detailed message that rx and labs are ready at the . Noted okay for wife to .  Barbara Nuñez

## 2017-12-29 ENCOUNTER — TELEPHONE (OUTPATIENT)
Dept: FAMILY MEDICINE | Facility: CLINIC | Age: 37
End: 2017-12-29

## 2017-12-29 NOTE — LETTER
December 29, 2017      Carlos Crane  [address] [address] [place] [address]        To Whom It May Concern,     Carlos Crane had lab work completed including titers for hepatitis B, measles, mumps, rubella, and varicella zoster. All of these were positive indicating immunity. His TDaP (tetanus, diphtheria, and pertussis) is also up to date and was last administered on 6/8/2016    If you have questions or concerns, please call the clinic at the number listed above.    Sincerely,       Ramírez Watson,   12/29/2017 3:33 PM

## 2017-12-29 NOTE — TELEPHONE ENCOUNTER
Called pt at number below and left detailed message that letter and copy of labs are ready at .  Barbara Nuñez

## 2017-12-29 NOTE — TELEPHONE ENCOUNTER
Reason for Call:  Form, our goal is to have forms completed with 72 hours, however, some forms may require a visit or additional information.    Type of letter, form or note:  school       Who is the form from?: Patient    Where did the form come from: Patient or family brought in       What clinic location was the form placed at?: [place]    Where the form was placed: called in    What number is listed as a contact on the form?: NA       Additional comments: pt needs 's notes with letter showing his immunity is up to date for school. Wants to  letter in clinic. Please call when ready. Ok to leave detailed voicemail on cell if no answer. Cell [phone]    Call taken on 12/29/2017 at 12:32 PM by Kamille Raya

## 2018-01-30 DIAGNOSIS — F90.0 ATTENTION DEFICIT HYPERACTIVITY DISORDER (ADHD), PREDOMINANTLY INATTENTIVE TYPE: ICD-10-CM

## 2018-01-30 RX ORDER — DEXTROAMPHETAMINE SACCHARATE, AMPHETAMINE ASPARTATE, DEXTROAMPHETAMINE SULFATE AND AMPHETAMINE SULFATE 2.5; 2.5; 2.5; 2.5 MG/1; MG/1; MG/1; MG/1
10 TABLET ORAL 2 TIMES DAILY
Qty: 60 TABLET | Refills: 0 | Status: CANCELLED | OUTPATIENT
Start: 2018-01-30

## 2018-01-30 NOTE — TELEPHONE ENCOUNTER
Reason for Call:  Medication or medication refill:amphetamine-dextroamphetamine (ADDERALL) 10 MG per tablet    Do you use a [facility]?  Name of the pharmacy and phone number for the current request:    Will  from the pharmacy in the clinic  Name of the medication requested: amphetamine-dextroamphetamine (ADDERALL) 10 MG per tablet    Other request: no    Can we leave a detailed message on this number? YES    Phone number patient can be reached at: Home number on file [phone] (home)    Best Time: anytime    Call taken on 1/30/2018 at 4:15 PM by Mray Hicks

## 2018-01-31 NOTE — TELEPHONE ENCOUNTER
amphetamine-dextroamphetamine (ADDERALL) 10 MG per tablet  Previously received 3 x 30 day supply.      Last Written Prescription Date:  1/1/2018  Last Fill Quantity: 60 tablet,   # refills: 0  Last Office Visit: 9/6/2017  Future Office visit:       Routing refill request to provider for review/approval because:  Drug not on the [facility], P or [facility] refill protocol or controlled substance      Controlled Substance Agreement Signed:  6/8/2016

## 2018-02-02 ENCOUNTER — TELEPHONE (OUTPATIENT)
Dept: FAMILY MEDICINE | Facility: CLINIC | Age: 38
End: 2018-02-02

## 2018-02-02 RX ORDER — DEXTROAMPHETAMINE SACCHARATE, AMPHETAMINE ASPARTATE, DEXTROAMPHETAMINE SULFATE AND AMPHETAMINE SULFATE 2.5; 2.5; 2.5; 2.5 MG/1; MG/1; MG/1; MG/1
10 TABLET ORAL 2 TIMES DAILY
Qty: 60 TABLET | Refills: 0 | Status: SHIPPED | OUTPATIENT
Start: 2018-03-05 | End: 2018-02-05

## 2018-02-02 RX ORDER — DEXTROAMPHETAMINE SACCHARATE, AMPHETAMINE ASPARTATE, DEXTROAMPHETAMINE SULFATE AND AMPHETAMINE SULFATE 2.5; 2.5; 2.5; 2.5 MG/1; MG/1; MG/1; MG/1
10 TABLET ORAL 2 TIMES DAILY
Qty: 60 TABLET | Refills: 0 | Status: SHIPPED | OUTPATIENT
Start: 2018-04-05 | End: 2018-02-05

## 2018-02-02 RX ORDER — DEXTROAMPHETAMINE SACCHARATE, AMPHETAMINE ASPARTATE, DEXTROAMPHETAMINE SULFATE AND AMPHETAMINE SULFATE 2.5; 2.5; 2.5; 2.5 MG/1; MG/1; MG/1; MG/1
10 TABLET ORAL 2 TIMES DAILY
Qty: 60 TABLET | Refills: 0 | Status: SHIPPED | OUTPATIENT
Start: 2018-02-02 | End: 2018-02-05

## 2018-02-02 NOTE — TELEPHONE ENCOUNTER
Adderall 10mg requires a Prior Authorization.   Reason / Alternatives per Insurance rejection:PA required      Would you like to change the medication or attempt the prior authorization?    Patient's prescription insurance is as follows:  Insurance Company: [place] [employer]/ [place] [employer]   Bin number: [id number]   Phone number: [phone]   ID # [id number]  Group #   [facility] # [id number]    Please advise and let pharmacy know if and when PA is approved or denied.    -Radha Moulton, Certified Pharmacy Technician, CPhT, [facility], Ph. [phone]

## 2018-02-02 NOTE — TELEPHONE ENCOUNTER
Central Prior Authorization Team   Phone: [phone]      PA Initiation    Medication: Adderall 10mg  Insurance Company: [employer] - Phone [phone] Fax [phone]  Pharmacy Filling the Rx: [facility] [place] [address] [place] [address]  Filling Pharmacy Phone: [phone]  Filling Pharmacy Fax: [phone]  Start Date: 2/2/2018

## 2018-02-02 NOTE — TELEPHONE ENCOUNTER
Three one month Rx's printed and walked to [facility] - [place] by me.  Pharmacy to contact patient to advise him of refill.    Umesh Gruber MD

## 2018-02-05 RX ORDER — DEXTROAMPHETAMINE SACCHARATE, AMPHETAMINE ASPARTATE, DEXTROAMPHETAMINE SULFATE AND AMPHETAMINE SULFATE 2.5; 2.5; 2.5; 2.5 MG/1; MG/1; MG/1; MG/1
10 TABLET ORAL 2 TIMES DAILY
Qty: 60 TABLET | Refills: 0 | Status: SHIPPED | OUTPATIENT
Start: 2018-04-05 | End: 2018-05-02

## 2018-02-05 RX ORDER — DEXTROAMPHETAMINE SACCHARATE, AMPHETAMINE ASPARTATE, DEXTROAMPHETAMINE SULFATE AND AMPHETAMINE SULFATE 2.5; 2.5; 2.5; 2.5 MG/1; MG/1; MG/1; MG/1
10 TABLET ORAL 2 TIMES DAILY
Qty: 60 TABLET | Refills: 0 | Status: SHIPPED | OUTPATIENT
Start: 2018-03-05 | End: 2018-05-02

## 2018-02-05 RX ORDER — DEXTROAMPHETAMINE SACCHARATE, AMPHETAMINE ASPARTATE, DEXTROAMPHETAMINE SULFATE AND AMPHETAMINE SULFATE 2.5; 2.5; 2.5; 2.5 MG/1; MG/1; MG/1; MG/1
10 TABLET ORAL 2 TIMES DAILY
Qty: 60 TABLET | Refills: 0 | Status: SHIPPED | OUTPATIENT
Start: 2018-02-05 | End: 2018-05-02

## 2018-02-05 NOTE — TELEPHONE ENCOUNTER
Wife called as these were to be hard copies picked up at  clinic but message was entered incorrectly.  Called  [place] pharmacy and she will destroy the rxs.  Can we print 3 new hard copies that wife can  at ?  See pended rxs.  Barbara Nuñez

## 2018-02-06 NOTE — TELEPHONE ENCOUNTER
Called [phone] and left detailed message letting pt know rxs are ready at  for .  Barbara Nuñez

## 2018-02-06 NOTE — TELEPHONE ENCOUNTER
Prior Authorization Approval    Authorization Effective Date: 2/6/2018  Authorization Expiration Date: 2/6/2019  Medication: amphetamine-dextroamphetamine (ADDERALL) 10 MG per tablet- APPROVED  Approved Dose/Quantity: 60/30 days  Reference #: [id number]   Insurance Company: [employer] - Phone [phone] Fax [phone]  Expected CoPay: n/a     Which Pharmacy is filling the prescription (Not needed for infusion/clinic administered): [facility] - [place], [place] - [address] [place] [address]  Pharmacy Notified: NoComment:  per note in ERx pt took script to another pharmacy  Patient Notified: YesComment:  left voicemail

## 2018-05-01 DIAGNOSIS — F90.0 ATTENTION DEFICIT HYPERACTIVITY DISORDER (ADHD), PREDOMINANTLY INATTENTIVE TYPE: ICD-10-CM

## 2018-05-01 RX ORDER — DEXTROAMPHETAMINE SACCHARATE, AMPHETAMINE ASPARTATE, DEXTROAMPHETAMINE SULFATE AND AMPHETAMINE SULFATE 2.5; 2.5; 2.5; 2.5 MG/1; MG/1; MG/1; MG/1
10 TABLET ORAL 2 TIMES DAILY
Qty: 60 TABLET | Refills: 0 | OUTPATIENT
Start: 2018-05-01

## 2018-05-01 NOTE — TELEPHONE ENCOUNTER
Adderall      Last Written Prescription Date:  2/5/18  Last Fill Quantity: 60,   # refills: 0  Last Office Visit: 9/6/17  Future Office visit:       Routing refill request to provider for review/approval because:  Drug not on the [facility], [facility] or [facility] refill protocol or controlled substance  Joi Herndon RN, BSN  [facility]

## 2018-05-01 NOTE — TELEPHONE ENCOUNTER
Denied. Needs to be seen.  Please have patient make appointment. No-showed his last appointment on 4/27/18.  We need weight, blood pressure, etc.  Please call patient.     Umesh Gruber MD

## 2018-05-01 NOTE — TELEPHONE ENCOUNTER
Please call [employer] at [phone] when prescription has been processed.   Ok to leave message: Yes    Refill request received via: Phone call from wife Hayley   Patient requesting refill for: amphetamine-dextroamphetamine (ADDERALL) 10 MG per tablet to be picked up at the   Last Office Visit: 09/06/17  Last Refill (see below):    amphetamine-dextroamphetamine (ADDERALL) 10 MG per tablet 60 tablet 0 2/5/2018  No   Sig: Take 1 tablet (10 mg) by mouth 2 times daily   Class: Local Print   Route: Oral   Order: [id number]     Joi Parra  Patient Representative - [facility]

## 2018-05-02 RX ORDER — DEXTROAMPHETAMINE SACCHARATE, AMPHETAMINE ASPARTATE, DEXTROAMPHETAMINE SULFATE AND AMPHETAMINE SULFATE 2.5; 2.5; 2.5; 2.5 MG/1; MG/1; MG/1; MG/1
10 TABLET ORAL 2 TIMES DAILY
Qty: 10 TABLET | Refills: 0 | Status: SHIPPED | OUTPATIENT
Start: 2018-05-02 | End: 2018-05-07

## 2018-05-02 NOTE — TELEPHONE ENCOUNTER
Called pt and made appt for Monday with MD JUAN JOSÉ.  Can we give him a small fill to cover until the appt on Monday?  Barbara Nuñez

## 2018-05-02 NOTE — TELEPHONE ENCOUNTER
Called wife and let her know rx is ready at the .  She will be picking this up for V.  I let her know I will put a consent to communicate in there for future.  Barbara Nuñez

## 2018-05-07 ENCOUNTER — OFFICE VISIT (OUTPATIENT)
Dept: FAMILY MEDICINE | Facility: CLINIC | Age: 38
End: 2018-05-07
Payer: COMMERCIAL

## 2018-05-07 VITALS
HEART RATE: 85 BPM | DIASTOLIC BLOOD PRESSURE: 78 MMHG | OXYGEN SATURATION: 99 % | WEIGHT: 158 LBS | BODY MASS INDEX: 24.75 KG/M2 | SYSTOLIC BLOOD PRESSURE: 126 MMHG | TEMPERATURE: 97.8 F

## 2018-05-07 DIAGNOSIS — L30.1 DYSHIDROTIC ECZEMA: ICD-10-CM

## 2018-05-07 DIAGNOSIS — F90.0 ATTENTION DEFICIT HYPERACTIVITY DISORDER (ADHD), PREDOMINANTLY INATTENTIVE TYPE: ICD-10-CM

## 2018-05-07 PROCEDURE — 99213 OFFICE O/P EST LOW 20 MIN: CPT | Performed by: FAMILY MEDICINE

## 2018-05-07 RX ORDER — CLOBETASOL PROPIONATE 0.5 MG/G
CREAM TOPICAL
Qty: 30 G | Refills: 3 | Status: SHIPPED | OUTPATIENT
Start: 2018-05-07 | End: 2021-01-14

## 2018-05-07 RX ORDER — DEXTROAMPHETAMINE SACCHARATE, AMPHETAMINE ASPARTATE, DEXTROAMPHETAMINE SULFATE AND AMPHETAMINE SULFATE 2.5; 2.5; 2.5; 2.5 MG/1; MG/1; MG/1; MG/1
10 TABLET ORAL 2 TIMES DAILY
Qty: 60 TABLET | Refills: 0 | Status: SHIPPED | OUTPATIENT
Start: 2018-05-07 | End: 2018-07-20

## 2018-05-07 RX ORDER — DEXTROAMPHETAMINE SACCHARATE, AMPHETAMINE ASPARTATE, DEXTROAMPHETAMINE SULFATE AND AMPHETAMINE SULFATE 2.5; 2.5; 2.5; 2.5 MG/1; MG/1; MG/1; MG/1
10 TABLET ORAL 2 TIMES DAILY
Qty: 60 TABLET | Refills: 0 | Status: SHIPPED | OUTPATIENT
Start: 2018-05-07 | End: 2018-05-07

## 2018-05-07 NOTE — MR AVS SNAPSHOT
After Visit Summary   5/7/2018    Carlos Crane    MRN: [id number]           Patient Information     Date Of Birth          1980        Visit Information        Provider Department      5/7/2018 10:00 AM Kenton Steele MD [facility]        Today's Diagnoses     Dyshidrotic eczema        Attention deficit hyperactivity disorder (ADHD), predominantly inattentive type           Follow-ups after your visit        Follow-up notes from your care team     Return in about 3 months (around 8/7/2018) for Routine Visit.      Who to contact     If you have questions or need follow up information about today's clinic visit or your schedule please contact [facility] [place] directly at [phone].  Normal or non-critical lab and imaging results will be communicated to you by MyChart, letter or phone within 4 business days after the clinic has received the results. If you do not hear from us within 7 days, please contact the clinic through Transactivhart or phone. If you have a critical or abnormal lab result, we will notify you by phone as soon as possible.  Submit refill requests through Payteller or call your pharmacy and they will forward the refill request to us. Please allow 3 business days for your refill to be completed.          Additional Information About Your Visit        MyChart Information     Payteller gives you secure access to your electronic health record. If you see a primary care provider, you can also send messages to your care team and make appointments. If you have questions, please call your primary care clinic.  If you do not have a primary care provider, please call [phone] and they will assist you.        Care EveryWhere ID     This is your Care EveryWhere ID. This could be used by other organizations to access your [facility] medical records  [id number]        Your Vitals Were     Pulse Temperature Pulse Oximetry BMI (Body Mass Index)          85 97.8  F  (36.6  C) (Oral) 99% 24.75 kg/m2         Blood Pressure from Last 3 Encounters:   05/07/18 126/78   09/06/17 118/62   07/05/17 118/62    Weight from Last 3 Encounters:   05/07/18 158 lb (71.7 kg)   09/06/17 157 lb (71.2 kg)   07/05/17 157 lb (71.2 kg)              Today, you had the following     No orders found for display         Today's Medication Changes          These changes are accurate as of 5/7/18 10:10 AM.  If you have any questions, ask your nurse or doctor.               Start taking these medicines.        Dose/Directions    amphetamine-dextroamphetamine 10 MG per tablet   Commonly known as:  ADDERALL   Used for:  Attention deficit hyperactivity disorder (ADHD), predominantly inattentive type   Started by:  Kenton Steele MD        Dose:  10 mg   Take 1 tablet (10 mg) by mouth 2 times daily   Quantity:  60 tablet   Refills:  0            Where to get your medicines      These medications were sent to [id number] - [place], [place] - [address]  [facility]   [address], [place] [address]    Hours:  M-F 8A-10P  Sat 9A-6P  Sun 10A-6P Phone:  [phone]     clobetasol 0.05 % cream         Some of these will need a paper prescription and others can be bought over the counter.  Ask your nurse if you have questions.     Bring a paper prescription for each of these medications     amphetamine-dextroamphetamine 10 MG per tablet                Primary Care Provider Office Phone # Fax #    Umesh Gruber Jr., [phone] [address]  [place] [address]        Equal Access to Services     JERRY HADLEY AH: Hadjames Lux, waallan parker, qaybta kaalmaryjane vargas. So [facility] [phone].    ATENCIÓN: Si habla español, tiene a patton disposición servicios gratuitos de asistencia lingüística. Llame al [phone].    We comply with applicable federal civil rights laws and [place] laws. We  do not discriminate on the basis of race, color, national origin, age, disability, sex, sexual orientation, or gender identity.            Thank you!     Thank you for choosing [facility] [place]  for your care. Our goal is always to provide you with excellent care. Hearing back from our patients is one way we can continue to improve our services. Please take a few minutes to complete the written survey that you may receive in the mail after your visit with us. Thank you!             Your Updated Medication List - Protect others around you: Learn how to safely use, store and throw away your medicines at www.disposemymeds.org.          This list is accurate as of 5/7/18 10:10 AM.  Always use your most recent med list.                   Brand Name Dispense Instructions for use Diagnosis    amphetamine-dextroamphetamine 10 MG per tablet    ADDERALL    60 tablet    Take 1 tablet (10 mg) by mouth 2 times daily    Attention deficit hyperactivity disorder (ADHD), predominantly inattentive type       clobetasol 0.05 % cream    TEMOVATE    30 g    Apply sparingly to affected area twice daily for 14 days.  Do not apply to face.    Dyshidrotic eczema       fluticasone 50 MCG/ACT spray    FLONASE    1 Package    Spray 1-2 sprays into both nostrils daily    Chronic rhinitis       montelukast 10 MG tablet    SINGULAIR    90 tablet    Take 1 tablet (10 mg) by mouth At Bedtime    Seasonal allergies

## 2018-05-07 NOTE — PROGRESS NOTES
SUBJECTIVE:   Carlos Crane is a 38 year old male who presents to clinic today for the following health issues:    Wants renewal of steroid cream for eczema    Washes hands a lot for work      Medication Followup of Adderall     Taking Medication as prescribed: yes    Side Effects:  None    Medication Helping Symptoms:  yes   Sleeps well.   No palpitations or chest pain.   Eats well.  No weight loss    Works as surgical tech      ROS:  General, neuro, sleep, psych, musculoskeletal system otherwise negative.     /78  Pulse 85  Temp 97.8  F (36.6  C) (Oral)  Wt 158 lb (71.7 kg)  SpO2 99%  BMI 24.75 kg/m2   GENERAL: healthy, alert and no distress  EYES: Eyes grossly normal to inspection, PERRL and conjunctivae and sclerae normal  RESP: lungs clear to auscultation - no rales, rhonchi or wheezes  CV: regular rate and rhythm, normal S1 S2, no S3 or S4, no murmur, click or rub, no peripheral edema and peripheral pulses strong  ars normal, affect normal/bright    ASSESSMENT:  1. Dyshidrotic eczema    - clobetasol (TEMOVATE) 0.05 % cream; Apply sparingly to affected area twice daily for 14 days.  Do not apply to face.  Dispense: 30 g; Refill: 3    2. Attention deficit hyperactivity disorder (ADHD), predominantly inattentive type  See back in 3 months  - amphetamine-dextroamphetamine (ADDERALL) 10 MG per tablet; Take 1 tablet (10 mg) by mouth 2 times daily  Dispense: 60 tablet; Refill: 0

## 2018-07-03 DIAGNOSIS — Z11.1 SCREENING EXAMINATION FOR PULMONARY TUBERCULOSIS: Primary | ICD-10-CM

## 2018-07-03 PROCEDURE — 86480 TB TEST CELL IMMUN MEASURE: CPT | Performed by: PHYSICIAN ASSISTANT

## 2018-07-03 PROCEDURE — 36415 COLL VENOUS BLD VENIPUNCTURE: CPT | Performed by: PHYSICIAN ASSISTANT

## 2018-07-03 NOTE — LETTER
July 9, 2018      Carlos Crane  [address] [address] [place] [address]        Dear ,    We are writing to inform you of your test results.    -TB screening was normal/negative.     Resulted Orders   M Tuberculosis by Quantiferon   Result Value Ref Range    M Tuberculosis Result Negative NEG^Negative    M Tuberculosis Antigen Value 0.01 IU/mL      Comment:      This is a qualitative test.  The TB antigen IU/mL value is required for   documentation on certain government reporting forms but this value should not   be used to monitor disease progression or response to therapy.  Diagnosing or excluding tuberculosis disease, and assessing the probability of   LTBI, require a combination of epidemiological, historical, medical and   diagnostic findings that should be taken into account when interpreting   QuantiFERON TB results.         If you have any questions or concerns, please call the clinic at the number listed above.       Sincerely,      Perla Shipley PA-C

## 2018-07-05 LAB
M TB TUBERC IFN-G BLD QL: NEGATIVE
M TB TUBERC IFN-G/MITOGEN IGNF BLD: 0.01 IU/ML

## 2018-07-06 ENCOUNTER — TELEPHONE (OUTPATIENT)
Dept: FAMILY MEDICINE | Facility: CLINIC | Age: 38
End: 2018-07-06

## 2018-07-06 NOTE — TELEPHONE ENCOUNTER
Pt called for TB results.    Advised of negative result    [facility] to  [facility]  Alerted TC.    Priya Park RN  [facility]

## 2018-07-08 NOTE — PROGRESS NOTES
Please call or write patient with the following results:    -TB screening was normal/negative.    Electronically Signed By: Perla Shipley PA-C

## 2018-07-20 DIAGNOSIS — F90.0 ATTENTION DEFICIT HYPERACTIVITY DISORDER (ADHD), PREDOMINANTLY INATTENTIVE TYPE: ICD-10-CM

## 2018-07-20 NOTE — TELEPHONE ENCOUNTER
Please call [employer] at [phone] when prescription has been processed.   Ok to leave message: Yes    Refill request received via: Phone call from PT wife Ashley, Will  in clinic  Patient requesting refill for: amphetamine-dextroamphetamine (ADDERALL) 10 MG per tablet  Last Office Visit: 05/07/18  Last Refill (see below):    amphetamine-dextroamphetamine (ADDERALL) 10 MG per tablet 60 tablet 0 5/7/2018  No   Sig - Route: Take 1 tablet (10 mg) by mouth 2 times daily - Oral   Class: Local Print   Order: [id number]     Joi Parra  Patient Representative - [facility]

## 2018-07-23 NOTE — TELEPHONE ENCOUNTER
Controlled Substance Refill Request for Adderall  Problem List Complete:  No     PROVIDER TO CONSIDER COMPLETION OF PROBLEM LIST AND OVERVIEW/CONTROLLED SUBSTANCE AGREEMENT    Last Written Prescription Date:  5/7/18  Last Fill Quantity: 60,   # refills: 0    Last Office Visit with [facility] primary care provider: 9/6/17    Future Office visit:     Controlled substance agreement on file: Yes:  Date 6/8/16.     Processing:  Patient will  in clinic  [place] reviewed: unable to review - website is down due to maintenance    [place]  [email or web address]  Joi Herndon RN, BSN  [facility]

## 2018-07-24 RX ORDER — DEXTROAMPHETAMINE SACCHARATE, AMPHETAMINE ASPARTATE, DEXTROAMPHETAMINE SULFATE AND AMPHETAMINE SULFATE 2.5; 2.5; 2.5; 2.5 MG/1; MG/1; MG/1; MG/1
10 TABLET ORAL 2 TIMES DAILY
Qty: 60 TABLET | Refills: 0 | Status: SHIPPED | OUTPATIENT
Start: 2018-07-24 | End: 2018-08-31

## 2018-07-24 NOTE — TELEPHONE ENCOUNTER
Called number in message below and left detailed message.  As consent to communicate that was provided in may has not been returned, V is the only one who can  this rx at the .  Also asked them to call to make appt in next 30 days for med policy review.  Barbara Nuñez

## 2018-07-24 NOTE — TELEPHONE ENCOUNTER
The requested prescription(s) has/have been approved and has/have been printed and signed. This note was forwarded to the patient care pool to contact the patient to arrange for the patient to obtain the signed prescription(s).    [place] now requires a controlled substance agreement for stimulant medications.  Please advise V that he needs to see me prior to getting his next refill so we can review and sign this document.    Umesh Gruber Jr

## 2018-08-31 DIAGNOSIS — F90.0 ATTENTION DEFICIT HYPERACTIVITY DISORDER (ADHD), PREDOMINANTLY INATTENTIVE TYPE: ICD-10-CM

## 2018-08-31 RX ORDER — DEXTROAMPHETAMINE SACCHARATE, AMPHETAMINE ASPARTATE, DEXTROAMPHETAMINE SULFATE AND AMPHETAMINE SULFATE 2.5; 2.5; 2.5; 2.5 MG/1; MG/1; MG/1; MG/1
10 TABLET ORAL 2 TIMES DAILY
Qty: 60 TABLET | Refills: 0 | Status: CANCELLED | OUTPATIENT
Start: 2018-08-31

## 2018-08-31 NOTE — TELEPHONE ENCOUNTER
Pt's wife calling to check status of refill.  Not sure where this paper copy is as it is not in the  clinic.  Please advise.  Barbara Nuñez

## 2018-08-31 NOTE — TELEPHONE ENCOUNTER
Reason for Call:  Medication or medication refill:    Do you use a [facility]?  Name of the pharmacy and phone number for the current request:  [facility] [place] [facility]    Name of the medication requested: amphetamine-dextroamphetamine (ADDERALL) 10 MG per tablet    Other request: na ,  Pt is out, Would like ASAP   Can we leave a detailed message on this number? YES    Phone number patient can be reached at: [phone]      Best Time: anytime,  Spouse will  ,    Call taken on 8/31/2018 at 9:18 AM by Breana Quinteros

## 2018-08-31 NOTE — TELEPHONE ENCOUNTER
I erroneously entered my last entry.  It should read:    I have pended the requested refill(s).  Please have one of the other MDs print and sign the controlled substance prescription(s) in my absence.     Umesh Gruber MD

## 2018-08-31 NOTE — TELEPHONE ENCOUNTER
Medication Detail         Disp Refills Start End DENYS     amphetamine-dextroamphetamine (ADDERALL) 10 MG per tablet 60 tablet 0 7/24/2018  No     Sig - Route: Take 1 tablet (10 mg) by mouth 2 times daily - Oral     Problem List Complete:  No     PROVIDER TO CONSIDER COMPLETION OF PROBLEM LIST AND OVERVIEW/CONTROLLED SUBSTANCE AGREEMENT    [place] [facility] [place]/07/2018    Future Office visit:     Controlled substance agreement on file: No.     Processing:  Patient will  in clinic    Routing refill request to provider for review/approval because:  Drug not on the [facility] [place]         Tyra Whitten RN  [facility]

## 2018-09-04 RX ORDER — DEXTROAMPHETAMINE SACCHARATE, AMPHETAMINE ASPARTATE, DEXTROAMPHETAMINE SULFATE AND AMPHETAMINE SULFATE 2.5; 2.5; 2.5; 2.5 MG/1; MG/1; MG/1; MG/1
10 TABLET ORAL 2 TIMES DAILY
Qty: 60 TABLET | Refills: 0 | Status: SHIPPED | OUTPATIENT
Start: 2018-09-04 | End: 2018-09-06

## 2018-09-04 RX ORDER — DEXTROAMPHETAMINE SACCHARATE, AMPHETAMINE ASPARTATE, DEXTROAMPHETAMINE SULFATE AND AMPHETAMINE SULFATE 2.5; 2.5; 2.5; 2.5 MG/1; MG/1; MG/1; MG/1
10 TABLET ORAL 2 TIMES DAILY
Qty: 60 TABLET | Refills: 0 | Status: SHIPPED | OUTPATIENT
Start: 2018-10-01 | End: 2018-09-06

## 2018-09-04 RX ORDER — DEXTROAMPHETAMINE SACCHARATE, AMPHETAMINE ASPARTATE, DEXTROAMPHETAMINE SULFATE AND AMPHETAMINE SULFATE 2.5; 2.5; 2.5; 2.5 MG/1; MG/1; MG/1; MG/1
10 TABLET ORAL 2 TIMES DAILY
Qty: 60 TABLET | Refills: 0 | Status: SHIPPED | OUTPATIENT
Start: 2018-11-01 | End: 2019-02-27

## 2018-09-06 ENCOUNTER — OFFICE VISIT (OUTPATIENT)
Dept: FAMILY MEDICINE | Facility: CLINIC | Age: 38
End: 2018-09-06
Payer: COMMERCIAL

## 2018-09-06 VITALS
TEMPERATURE: 97.7 F | SYSTOLIC BLOOD PRESSURE: 126 MMHG | HEIGHT: 67 IN | HEART RATE: 94 BPM | OXYGEN SATURATION: 100 % | WEIGHT: 162 LBS | DIASTOLIC BLOOD PRESSURE: 78 MMHG | BODY MASS INDEX: 25.43 KG/M2

## 2018-09-06 DIAGNOSIS — R21 RASH AND NONSPECIFIC SKIN ERUPTION: Primary | ICD-10-CM

## 2018-09-06 PROCEDURE — 99214 OFFICE O/P EST MOD 30 MIN: CPT | Performed by: NURSE PRACTITIONER

## 2018-09-06 NOTE — PROGRESS NOTES
SUBJECTIVE:   Carlos Crane is a 38 year old male who presents to clinic today for the following health issues:    Rash  Onset: 6 months, was in ER for the same things please see 4/26/18 encounter at [facility]    Description:   Location: all over  Character: round, red  Itching (Pruritis): YES - will starting itching and then rash becomes more visible    Progression of Symptoms:  Same    Accompanying Signs & Symptoms:  Fever: no   Body aches or joint pain: no   Sore throat symptoms: no   Recent cold symptoms: no     History:   Previous similar rash: no     Precipitating factors:   Exposure to similar rash: no   New exposures: None   Recent travel: no     Alleviating factors:  nothing    Therapies Tried and outcome: Has used antifungal shampoo (selsum blue),  prednisone and permethrin cream, anti itch cream    States that nothing has helped.     Wife does not have a similar rash.    Tried change in laundry detergent.     No other new lotions, creams or exposures.     History of eczema, primarily on hands - works in OR and washing hands triggers eczema.  This is a different rash.      Canceled trip to [place] that was planned for next week due to concern regarding rash.    Needs letter from provider regarding reasoning for cancellation.       Problem list and histories reviewed & adjusted, as indicated.      Patient Active Problem List   Diagnosis     CARDIOVASCULAR SCREENING; LDL GOAL LESS THAN 160     Attention deficit hyperactivity disorder (ADHD), predominantly inattentive type     Environmental allergies     Dyshidrotic eczema     Past Surgical History:   Procedure Laterality Date     NO HISTORY OF SURGERY         Social History   Substance Use Topics     Smoking status: Never Smoker     Smokeless tobacco: Never Used     Alcohol use Yes     Family History   Problem Relation Age of Onset     Hypertension Mother      Diabetes Mother      type 2     Cerebrovascular Disease Mother 60     non-smoker      "Coronary Artery Disease Father 60     MI, non-smoker     Breast Cancer No family hx of      Colon Cancer No family hx of      Genetic Disorder No family hx of      Thyroid Disease No family hx of      Mental Illness No family hx of            Reviewed and updated as needed this visit by clinical staff       Reviewed and updated as needed this visit by Provider         ROS:  CONSTITUTIONAL: NEGATIVE for fever, chills, change in weight  INTEGUMENTARY/SKIN: see HPI  ENT/MOUTH: NEGATIVE for ear, mouth and throat problems  RESP: NEGATIVE for significant cough or SOB  CV: NEGATIVE for chest pain, palpitations or peripheral edema    OBJECTIVE:     /78 (BP Location: Right arm, Patient Position: Sitting, Cuff Size: Adult Regular)  Pulse 94  Temp 97.7  F (36.5  C) (Oral)  Ht 5' 7\" (1.702 m)  Wt 162 lb (73.5 kg)  SpO2 100%  BMI 25.37 kg/m2  Body mass index is 25.37 kg/(m^2).    GENERAL: healthy, alert and no distress  SKIN: skin is warm, dry and intact. Torso, upper and lower extremities with diffuse papular, mildly erythematous rash.  Mild scaling visible.    PSYCH: mentation appears normal, affect normal/bright    ASSESSMENT/PLAN:     Carlos was seen today for derm problem.    Diagnoses and all orders for this visit:    Rash and nonspecific skin eruption  6 month history, unclear etiology with multiple treatment attempts without improvements (OTC products, Permethrin, Prednisone)  History of eczema; however, rash is different than eczematous rash.   Discussed dermatology referral.    -     DERMATOLOGY REFERRAL    Follow-up here in clinic as needed, schedule annual physical.       SALOMON Dee [facility] [place]  "

## 2018-09-06 NOTE — MR AVS SNAPSHOT
After Visit Summary   9/6/2018    Carlos Crane    MRN: [id number]           Patient Information     Date Of Birth          1980        Visit Information        Provider Department      9/6/2018 10:00 AM Mend[facility] Jensen [facility] C[facility]        Today's Diagnoses     Screening for HIV (human immunodeficiency virus)    -  1    Rash and nonspecific skin eruption           Follow-ups after your visit        Additional Services     DERMATOLOGY REFERRAL       Your provider has referred you to:   [facility]: [facility] [place] [phone]   [email or web address]    [place]: [facility] Dermatologist - [place] [phone]   [email or web address]    Please be aware that coverage of these services is subject to the terms and limitations of your health insurance plan.  Call member services at your health plan with any benefit or coverage questions.      Please bring the following with you to your appointment:    (1) Any X-Rays, CTs or MRIs which have been performed.  Contact the facility where they were done to arrange for  prior to your scheduled appointment.    (2) List of current medications  (3) This referral request   (4) Any documents/labs given to you for this referral                  Follow-up notes from your care team     Return in about 2 months (around 11/6/2018) for Physical Exam.      Who to contact     If you have questions or need follow up information about today's clinic visit or your schedule please contact [facility] [place] directly at [phone].  Normal or non-critical lab and imaging results will be communicated to you by MyChart, letter or phone within 4 business days after the clinic has received the results. If you do not hear from us within 7 days, please contact the clinic through MyChart or phone. If you have a critical or abnormal lab result, we will notify you by phone as soon as possible.  Submit refill  "requests through [employer] or call your pharmacy and they will forward the refill request to us. Please allow 3 business days for your refill to be completed.          Additional Information About Your Visit        [employer] Information     [employer] gives you secure access to your electronic health record. If you see a primary care provider, you can also send messages to your care team and make appointments. If you have questions, please call your primary care clinic.  If you do not have a primary care provider, please call [phone] and they will assist you.        Care EveryWhere ID     This is your Care EveryWhere ID. This could be used by other organizations to access your [facility] medical records  [id number]        Your Vitals Were     Pulse Temperature Height Pulse Oximetry BMI (Body Mass Index)       94 97.7  F (36.5  C) (Oral) 5' 7\" (1.702 m) 100% 25.37 kg/m2        Blood Pressure from Last 3 Encounters:   09/06/18 126/78   05/07/18 126/78   09/06/17 118/62    Weight from Last 3 Encounters:   09/06/18 162 lb (73.5 kg)   05/07/18 158 lb (71.7 kg)   09/06/17 157 lb (71.2 kg)              We Performed the Following     DERMATOLOGY REFERRAL          Today's Medication Changes          These changes are accurate as of 9/6/18 10:32 AM.  If you have any questions, ask your nurse or doctor.               These medicines have changed or have updated prescriptions.        Dose/Directions    amphetamine-dextroamphetamine 10 MG per tablet   Commonly known as:  ADDERALL   This may have changed:  Another medication with the same name was removed. Continue taking this medication, and follow the directions you see here.   Used for:  Attention deficit hyperactivity disorder (ADHD), predominantly inattentive type   Changed by:  Mendy Jensen APRN CNP        Dose:  10 mg   Start taking on:  11/1/2018   Take 1 tablet (10 mg) by mouth 2 times daily   Quantity:  60 tablet   Refills:  0         Stop taking these medicines if " you haven't already. Please contact your care team if you have questions.     montelukast 10 MG tablet   Commonly known as:  SINGULAIR   Stopped by:  Mendy Jensen APRN CNP                    Primary Care Provider Office Phone # Fax #    Umesh Gruber Jr., [phone] [address]  [place] [address]        Equal Access to Services     [facility]: Hadii aad ku hadasho Soomaali, waaxda luqadaha, qaybta kaalmada adeegyada, waxay idiin hayaan adeeg kharash la'aan . So [facility] [phone].    ATENCIÓN: Si habla español, tiene a patton disposición servicios gratuitos de asistencia lingüística. Kenisha al [phone].    We comply with applicable federal civil rights laws and [place] laws. We do not discriminate on the basis of race, color, national origin, age, disability, sex, sexual orientation, or gender identity.            Thank you!     Thank you for choosing [facility]  for your care. Our goal is always to provide you with excellent care. Hearing back from our patients is one way we can continue to improve our services. Please take a few minutes to complete the written survey that you may receive in the mail after your visit with us. Thank you!             Your Updated Medication List - Protect others around you: Learn how to safely use, store and throw away your medicines at www.disposemymeds.org.          This list is accurate as of 9/6/18 10:32 AM.  Always use your most recent med list.                   Brand Name Dispense Instructions for use Diagnosis    ALLEGRA PO           amphetamine-dextroamphetamine 10 MG per tablet   Start taking on:  11/1/2018    ADDERALL    60 tablet    Take 1 tablet (10 mg) by mouth 2 times daily    Attention deficit hyperactivity disorder (ADHD), predominantly inattentive type       clobetasol 0.05 % cream    TEMOVATE    30 g    Apply sparingly to affected area twice daily for 14 days.  Do not apply to face.    Dyshidrotic eczema        fluticasone 50 MCG/ACT spray    FLONASE    1 Package    Spray 1-2 sprays into both nostrils daily    Chronic rhinitis

## 2018-09-06 NOTE — LETTER
September 6, 2018      Carlos Crane  [address] [address] [place] [id number]        To Whom It May Concern,         Carlos Crane was seen in clinic on 9/6/18 for a persistent rash and required a referral to a dermatologist for further evaluation. He was unable to continue with his previously scheduled travel plans due to medical condition.              Sincerely,        SALOMON Dee CNP

## 2018-11-26 DIAGNOSIS — F90.0 ATTENTION DEFICIT HYPERACTIVITY DISORDER (ADHD), PREDOMINANTLY INATTENTIVE TYPE: Primary | ICD-10-CM

## 2018-11-26 RX ORDER — DEXTROAMPHETAMINE SACCHARATE, AMPHETAMINE ASPARTATE, DEXTROAMPHETAMINE SULFATE AND AMPHETAMINE SULFATE 2.5; 2.5; 2.5; 2.5 MG/1; MG/1; MG/1; MG/1
10 TABLET ORAL 2 TIMES DAILY
Qty: 60 TABLET | Refills: 0 | Status: CANCELLED | OUTPATIENT
Start: 2018-11-26

## 2018-11-26 NOTE — TELEPHONE ENCOUNTER
Patient's wife called today.    Patient needs medication refill for Adderall.    Please contact patient.    Thank you.    Central Scheduling  Amy RAY

## 2018-11-26 NOTE — TELEPHONE ENCOUNTER
Controlled Substance Refill Request for amphetamine-dextroamphetamine (ADDERALL) 10 MG per tablet  Requesting 3 x 30-day Supply  Problem List Complete:  No     PROVIDER TO CONSIDER COMPLETION OF PROBLEM LIST AND OVERVIEW/CONTROLLED SUBSTANCE AGREEMENT    Last Written Prescription Date:  11/1/2018  Last Fill Quantity: 60 tablet,   # refills: 0    Last Office Visit with [facility] primary care provider: 9/6/2018  Meixl    Future Office visit:     Controlled substance agreement on file: 6/8/2016  Yasmani     Processing:  Patient will  in clinic   checked in past 3 months?  No, route to RN

## 2018-11-27 NOTE — TELEPHONE ENCOUNTER
checked today no concerns.   Do you want ADD med check or physical?     Mahogany Graham RN- Triage [employer]

## 2018-11-28 RX ORDER — DEXTROAMPHETAMINE SACCHARATE, AMPHETAMINE ASPARTATE, DEXTROAMPHETAMINE SULFATE AND AMPHETAMINE SULFATE 2.5; 2.5; 2.5; 2.5 MG/1; MG/1; MG/1; MG/1
10 TABLET ORAL 2 TIMES DAILY
Qty: 60 TABLET | Refills: 0 | Status: SHIPPED | OUTPATIENT
Start: 2018-11-28 | End: 2019-02-27

## 2018-11-28 RX ORDER — DEXTROAMPHETAMINE SACCHARATE, AMPHETAMINE ASPARTATE, DEXTROAMPHETAMINE SULFATE AND AMPHETAMINE SULFATE 2.5; 2.5; 2.5; 2.5 MG/1; MG/1; MG/1; MG/1
10 TABLET ORAL 2 TIMES DAILY
Qty: 60 TABLET | Refills: 0 | Status: SHIPPED | OUTPATIENT
Start: 2018-12-29 | End: 2019-02-27

## 2018-11-28 RX ORDER — DEXTROAMPHETAMINE SACCHARATE, AMPHETAMINE ASPARTATE, DEXTROAMPHETAMINE SULFATE AND AMPHETAMINE SULFATE 2.5; 2.5; 2.5; 2.5 MG/1; MG/1; MG/1; MG/1
10 TABLET ORAL 2 TIMES DAILY
Qty: 60 TABLET | Refills: 0 | Status: SHIPPED | OUTPATIENT
Start: 2019-01-29 | End: 2019-02-28

## 2018-11-28 NOTE — TELEPHONE ENCOUNTER
Was seen in Sept with normal BP, pulse and weight.  Will refill for another three months, then will need to be seen.  The requested prescription(s) has/have been approved and has/have been printed and signed. This note was forwarded to the patient care pool to contact the patient to arrange for the patient to obtain the signed prescription(s).  Umesh Gruber Jr

## 2018-11-28 NOTE — TELEPHONE ENCOUNTER
Called Ashley and left detailed message letting her know rxs are at .  3 months were filled and he will need appt after that.  Barbara Nuñez

## 2019-01-28 ENCOUNTER — TELEPHONE (OUTPATIENT)
Dept: FAMILY MEDICINE | Facility: CLINIC | Age: 39
End: 2019-01-28

## 2019-01-28 NOTE — TELEPHONE ENCOUNTER
Reason for Call:  Other call back    Detailed comments: Patient's partner called (CTC filed) and states they cannot find the prescription for adderall. Wants to know if they can  another one. Will call back if they can find it.    Phone Number Patient can be reached at: Other phone number: [phone]    Best Time: anytime    Can we leave a detailed message on this number? YES    Call taken on 1/28/2019 at 2:38 PM by Darshana DONATO

## 2019-01-28 NOTE — TELEPHONE ENCOUNTER
Dr. Gruber, please see below note. CTA on file is from 2016. Please advise. 9-6-2017 is last OV you had with patient. Please advise. Thank you, Hamida Colon R.N.

## 2019-01-29 NOTE — TELEPHONE ENCOUNTER
Per his signed controlled substance agreement, I'm unable to give him a replacement Rx.  He's responsible for all three Rx's when he chose to refill his medication that way.  His CSA clearly states lost prescriptions will not be replaced.  Please advise him he needs to make an appointment to see me when he's next due for a refill in late February.     Please call patient.     Umesh Gruber MD

## 2019-02-27 ENCOUNTER — OFFICE VISIT (OUTPATIENT)
Dept: FAMILY MEDICINE | Facility: CLINIC | Age: 39
End: 2019-02-27
Payer: COMMERCIAL

## 2019-02-27 VITALS
HEIGHT: 67 IN | DIASTOLIC BLOOD PRESSURE: 78 MMHG | HEART RATE: 88 BPM | SYSTOLIC BLOOD PRESSURE: 124 MMHG | TEMPERATURE: 97.9 F | BODY MASS INDEX: 26.06 KG/M2 | OXYGEN SATURATION: 99 % | WEIGHT: 166 LBS

## 2019-02-27 DIAGNOSIS — Z79.899 CONTROLLED SUBSTANCE AGREEMENT SIGNED: ICD-10-CM

## 2019-02-27 DIAGNOSIS — F90.0 ATTENTION DEFICIT HYPERACTIVITY DISORDER (ADHD), PREDOMINANTLY INATTENTIVE TYPE: Primary | ICD-10-CM

## 2019-02-27 PROCEDURE — 99214 OFFICE O/P EST MOD 30 MIN: CPT | Performed by: PHYSICIAN ASSISTANT

## 2019-02-27 RX ORDER — DEXTROAMPHETAMINE SACCHARATE, AMPHETAMINE ASPARTATE, DEXTROAMPHETAMINE SULFATE AND AMPHETAMINE SULFATE 2.5; 2.5; 2.5; 2.5 MG/1; MG/1; MG/1; MG/1
10 TABLET ORAL 2 TIMES DAILY
Qty: 60 TABLET | Refills: 0 | Status: SHIPPED | OUTPATIENT
Start: 2019-03-30 | End: 2019-09-26

## 2019-02-27 RX ORDER — DEXTROAMPHETAMINE SACCHARATE, AMPHETAMINE ASPARTATE, DEXTROAMPHETAMINE SULFATE AND AMPHETAMINE SULFATE 2.5; 2.5; 2.5; 2.5 MG/1; MG/1; MG/1; MG/1
10 TABLET ORAL 2 TIMES DAILY
Qty: 60 TABLET | Refills: 0 | Status: SHIPPED | OUTPATIENT
Start: 2019-02-27 | End: 2019-06-01

## 2019-02-27 RX ORDER — DEXTROAMPHETAMINE SACCHARATE, AMPHETAMINE ASPARTATE, DEXTROAMPHETAMINE SULFATE AND AMPHETAMINE SULFATE 2.5; 2.5; 2.5; 2.5 MG/1; MG/1; MG/1; MG/1
10 TABLET ORAL 2 TIMES DAILY
Qty: 60 TABLET | Refills: 0 | Status: SHIPPED | OUTPATIENT
Start: 2019-04-30 | End: 2019-09-26

## 2019-02-27 ASSESSMENT — MIFFLIN-ST. JEOR: SCORE: 1626.6

## 2019-02-27 NOTE — PROGRESS NOTES
SUBJECTIVE:   Carlos Crane is a 39 year old male who presents to clinic today for the following health issues:      Medication Followup of Adderall; scheduled with me because he couldn't get in to see Dr. Gruber. Is difficult to see so considering transitioning his care to me.  Reports treatment for ADHD for at least the past 7 yrs - reports evaluation by psychiatry in [place].   Has been on adderall 10mg twice daily since that time.  Tolerates well.  Feels like it does help, but sometimes is less helpful than other times.  Current routine includes school work.  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   If takes med on an empty stomach feels it works better.  Has tried XR formulation, but pt felt like he had more of a withdrawal type feeling from this.  Thus, states in the past they worked with the pharmacist who suggested taking the IR formulation.   Getting homework and school projects done and feels he is doing well.   Estimates that 30-50% of the time he isn't always able to concentrate as well.   Usually takes around 5am if working and then next at 2pm.  Of note, pt reports he has since moved to [place]. Has kept coming here though as has been a [facility] patient for years.       Taking Medication as prescribed: yes    Side Effects:  None    Medication Helping Symptoms:  Yes - helping minimally       Problem list and histories reviewed & adjusted, as indicated.  Additional history: as documented    Patient Active Problem List   Diagnosis     CARDIOVASCULAR SCREENING; LDL GOAL LESS THAN 160     Attention deficit hyperactivity disorder (ADHD), predominantly inattentive type     Environmental allergies     Dyshidrotic eczema     Controlled substance agreement signed     Past Surgical History:   Procedure Laterality Date     NO HISTORY OF SURGERY         Social History     Tobacco Use     Smoking status: Never Smoker      Smokeless tobacco: Never Used   Substance Use Topics     Alcohol use: Yes     Family History   Problem Relation Age of Onset     Hypertension Mother      Diabetes Mother         type 2     Cerebrovascular Disease Mother 60        non-smoker     Coronary Artery Disease Father 60        MI, non-smoker     Breast Cancer No family hx of      Colon Cancer No family hx of      Genetic Disorder No family hx of      Thyroid Disease No family hx of      Mental Illness No family hx of          Current Outpatient Medications   Medication Sig Dispense Refill     amphetamine-dextroamphetamine (ADDERALL) 10 MG tablet Take 1 tablet (10 mg) by mouth 2 times daily 60 tablet 0     [START ON 3/30/2019] amphetamine-dextroamphetamine (ADDERALL) 10 MG tablet Take 1 tablet (10 mg) by mouth 2 times daily 60 tablet 0     [START ON 4/30/2019] amphetamine-dextroamphetamine (ADDERALL) 10 MG tablet Take 1 tablet (10 mg) by mouth 2 times daily 60 tablet 0     amphetamine-dextroamphetamine (ADDERALL) 10 MG tablet Take 1 tablet (10 mg) by mouth 2 times daily 60 tablet 0     clobetasol (TEMOVATE) 0.05 % cream Apply sparingly to affected area twice daily for 14 days.  Do not apply to face. (Patient not taking: Reported on 9/6/2018) 30 g 3     Fexofenadine HCl (ALLEGRA PO)        fluticasone (FLONASE) 50 MCG/ACT nasal spray Spray 1-2 sprays into both nostrils daily (Patient not taking: Reported on 9/6/2018) 1 Package 11     No Known Allergies    Reviewed and updated as needed this visit by clinical staff  Tobacco  Allergies  Meds  Problems  Med Hx  Surg Hx  Fam Hx  Soc Hx        Reviewed and updated as needed this visit by Provider  Tobacco  Allergies  Meds  Problems  Med Hx  Surg Hx  Fam Hx  Soc Hx          ROS:  Constitutional, neuro, psych, GI systems are negative, except as otherwise noted.    OBJECTIVE:     /78 (BP Location: Right arm, Cuff Size: Adult Regular)   Pulse 88   Temp 97.9  F (36.6  C) (Oral)   Ht 1.702 m  "(5' 7\")   Wt 75.3 kg (166 lb)   SpO2 99%   BMI 26.00 kg/m    Body mass index is 26 kg/m .  GENERAL: healthy, alert and no distress  EYES: Eyes grossly normal to inspection, PERRL and conjunctivae and sclerae normal  RESP: lungs clear to auscultation - no rales, rhonchi or wheezes  CV: regular rates and rhythm and no murmur, click or rub  NEURO: Normal strength and tone, mentation intact and speech normal  PSYCH: mentation appears normal, affect normal/bright    Diagnostic Test Results:  none     ASSESSMENT/PLAN:       ICD-10-CM    1. Attention deficit hyperactivity disorder (ADHD), predominantly inattentive type F90.0 amphetamine-dextroamphetamine (ADDERALL) 10 MG tablet     amphetamine-dextroamphetamine (ADDERALL) 10 MG tablet     amphetamine-dextroamphetamine (ADDERALL) 10 MG tablet   2. Controlled substance agreement signed Z79.899    Previous care with Dr. Gruber, but planning to switch to me today given difficulty in seeing him due to schedule shortage.  Thus, controlled substance contract updated today and risks/appropriate use of meds reviewed in detail.  Pt reports previous eval with psychiatry while living in [place] - he will check his records and bring to follow up appt to see if we can keep copy on file for him.  Stable on adderall 10mg BID for past few years and pt reports some days he doesn't find it as effective, but seems to be impacted by eating breakfast. Let him know that it's important to eat breakfast and med absorption is not impacted by food that I'm aware of. Given he still finds it effective most of the time, we will continue dose for now and he will check with the pharmacist to see if there's something he is doing that may be impacting it's absorption.   If worsening at follow-up could consider 20mg in the morning and 10mg in PM.   completed and no concerns.  See Patient Instructions  Pt in agreement with plan.   A total of 25 minutes was spent with the patient today, with greater " than 50% of the visit involving counseling and coordination of care regarding that noted above.    Patient Instructions   Med refilled.  Continue without changes.  Controlled substance contract updated.    Perla Quezada PA-C  [facility]

## 2019-02-27 NOTE — LETTER
[facility]  02/27/19    Patient: Carlos Crane  YOB: 1980  Medical Record Number: [id number]  CSN: [id number]                                                                              Non-opioid Controlled Substance Agreement    I understand that my care provider has prescribed a controlled substance to help manage my condition(s). I am taking this medicine to help me function or work. I know this is strong medicine, and that it can cause serious side effects. Controlled substances can be sedating, addicting and may cause a dependency on the drug. They can affect my ability to drive or think, and cause depression. They need to be taken exactly as prescribed. Combining controlled substances with certain medicines or chemicals (such as cocaine, sedatives and tranquilizers, sleeping pills, meth) can be dangerous or even fatal. Also, if I stop controlled substances suddenly, I may have severe withdrawal symptoms.  If not helpful, I may be asked to stop them.    The risks, benefits, and side effects of these medicine(s) were explained to me. I agree that:    1. I will take part in other treatments as advised by my care team. This may be psychiatry or counseling, physical therapy, behavioral therapy, group treatment or a referral to a pain clinic. I will reduce or stop my medicine when my care team tells me to do so.  2. I will take my medicines as prescribed. I will not change the dose or schedule unless my care team tells me to. There will be no refills if I  run out early.   I may be contactedwithout warning and asked to complete a urine drug test or pill count at any time.   3. I will keep all my appointments, and understand this is part of the monitoring of controlled substances. My care team may require an office visit for EVERY controlled substance refill. If I miss appointments or don t follow instructions, my care team may stop my medicine.  4. I will not ask other providers  to prescribe controlled substances, and I will not accept controlled substances from other people. If I need another prescribed controlled substance for a new reason, I will tell my care team within 1 business day.  5. I will use one pharmacy to fill all of my controlled substance prescriptions, and it is up to me to make sure that I do not run out of my medicines on weekends or holidays. If my care team is willing to refill my controlled substance prescription without a visit, I must request refills only during office hours, refills may take up to 3 days to process, and it may take up to 5 to 7 days for my medicine to be mailed and ready at my pharmacy. Prescriptions will not be mailed anywhere except my pharmacy.    6. I am responsible for my prescriptions. If the medicine/prescription is lost or stolen, it will not be replaced. I also agree not to share controlled substance medicines with anyone.              [facility]  02/27/19  Patient:  Carlos Crane  YOB: 1980  Medical Record Number: [id number]  CSN: [id number]    7. I agree to not use ANY illegal or recreational drugs. This includes marijuana, cocaine, bath salts or other drugs. I agree not to use alcohol unless my care team says I may. I agree to give urine samples whenever asked. If I don t give a urine sample, the care team may stop my medicine.    8. If I enroll in the [place] Medical Marijuana program, I will tell my care team. I will also sign an agreement to share my medical records with my care team.    9. I will bring in my list of medicines (or my medicine bottles) each time I come to the clinic.   10. I will tell my care team right away if I become pregnant or have a new medical problem treated outside of my regular clinic.  11. I understand that this medicine can affect my thinking and judgment. It may be unsafe for me to drive, use machinery and do dangerous tasks. I will not do any of these things until I know  how the medicine affects me. If my dose changes, I will wait to see how it affects me. I will contact my care team if I have concerns about medicine side effects.    I understand that if I do not follow any of the conditions above, my prescriptions or treatment may be stopped.      I agree that my provider, clinic care team, and pharmacy may work with any city, state or federal law enforcement agency that investigates the misuse, sale, or other diversion of my controlled medicine. I will allow my provider to discuss my care with or share a copy of this agreement with any other treating provider, pharmacy or emergency room where I receive care. I agree to give up (waive) any right of privacy or confidentiality with respect to these consents.   I have read this agreement and have asked questions about anything I did not understand.    ____________________________________________________    ____________  ________  Patient signature                                                         Date      Time    ____________________________________________________     ____________  ________  Witness                                                          Date      Time    ____________________________________________________  Provider signature

## 2019-02-28 ENCOUNTER — TELEPHONE (OUTPATIENT)
Dept: FAMILY MEDICINE | Facility: CLINIC | Age: 39
End: 2019-02-28

## 2019-02-28 NOTE — TELEPHONE ENCOUNTER
Prior Authorization Retail Medication Request    Medication/Dose: Adderall 10mg  ICD code (if different than what is on RX):    Previously Tried and Failed:  See chart  Rationale:  Plan does not cover this medication.  I believe we have PAd this for him in the past    Insurance Name:  Not listed  Insurance ID:  [id number]      Pharmacy Information (if different than what is on RX)  Name:  [facility]  Phone:  [phone]    Prescriber, please advise on if you want to change medication or initiate PA.  Barbara Nuñez

## 2019-03-05 NOTE — TELEPHONE ENCOUNTER
PA Initiation    Medication: Adderall 10mg  Insurance Company: [employer] - Phone [phone] Fax [phone]  Pharmacy Filling the Rx: [facility] [address] [place] [address] SERVICE  AT [facility] [address]   Filling Pharmacy Phone: [phone]  Filling Pharmacy Fax:    Start Date: 3/5/2019    Central Prior Authorization Team   Phone: [phone]

## 2019-03-05 NOTE — TELEPHONE ENCOUNTER
Prior Authorization Approval    Authorization Effective Date: 3/5/2019  Authorization Expiration Date: 3/4/2020  Medication: Adderall 10mg  Approved Dose/Quantity: 60  Reference #: [id number]   Insurance Company: [employer] - Phone [phone] Fax [phone]  Expected CoPay:       CoPay Card Available:      Foundation Assistance Needed:    Which Pharmacy is filling the prescription (Not needed for infusion/clinic administered): [facility] [address] [place] [address] SERVICE  AT [facility] OF [address]  Pharmacy Notified: Yes  Patient Notified: Yes

## 2019-05-10 DIAGNOSIS — F90.0 ATTENTION DEFICIT HYPERACTIVITY DISORDER (ADHD), PREDOMINANTLY INATTENTIVE TYPE: ICD-10-CM

## 2019-05-10 RX ORDER — DEXTROAMPHETAMINE SACCHARATE, AMPHETAMINE ASPARTATE, DEXTROAMPHETAMINE SULFATE AND AMPHETAMINE SULFATE 2.5; 2.5; 2.5; 2.5 MG/1; MG/1; MG/1; MG/1
10 TABLET ORAL 2 TIMES DAILY
Qty: 60 TABLET | Refills: 0 | Status: CANCELLED | OUTPATIENT
Start: 2019-05-10

## 2019-05-10 NOTE — TELEPHONE ENCOUNTER
Requested Prescriptions   Pending Prescriptions Disp Refills     amphetamine-dextroamphetamine (ADDERALL) 10 MG tablet 60 tablet 0     Sig: Take 1 tablet (10 mg) by mouth 2 times daily  Last Written Prescription Date:  04/30/2019  Last Fill Quantity: 60 tablet,  # refills: 0   Last Office Visit: 2/27/2019 Perla Quezada PA-C  Future Office Visit:            There is no refill protocol information for this order

## 2019-05-10 NOTE — TELEPHONE ENCOUNTER
Reason for Call:   medication refill:    Do you use a [facility]?  Name of the pharmacy and phone number for the current request:     at  Location    Name of the medication requested:   amphetamine-dextroamphetamine (ADDERALL) 10 MG tablet      Can we leave a detailed message on this number?   Yes    Phone number patient can be reached at: Other phone number:  [phone]    Best Time: any    Call taken on 5/10/2019 at 10:26 AM by Vale Bello

## 2019-05-13 NOTE — TELEPHONE ENCOUNTER
Too soon to fill - last rx is dated 4/30/19 and per  site, patient filled on 4/29/19.  VIKY HerreraN, RN  [facility]

## 2019-05-13 NOTE — TELEPHONE ENCOUNTER
Controlled Substance Refill Request for:  amphetamine-dextroamphetamine (ADDERALL) 10 MG tablet  Medication may not be due for a refill.  Patient may be requesting 3 x 30-day supply.    Last Written Prescription Date:  4/30/2019  Last Fill Quantity: 60 tablet,   # refills: 0    Last Office Visit with [facility] primary care provider: 2/27/2019  Pilar  THE MOST RECENT OFFICE VISIT MUST BE WITHIN THE PAST 3 MONTHS. (AT LEAST ONE FACE TO FACE VISIT MUST OCCUR EVERY 6 MONTHS. ADDITIONAL VISITS CAN BE VIRTUAL.)    Future Office visit:     Controlled substance agreement:   Encounter-Level CSA - 06/08/2016:    Controlled Substance Agreement - Scan on 6/15/2016 11:03 AM: CONTROLLED SUBSTANCE AGREEMENT (below)       Encounter-Level CSA - 12/12/2014:    Controlled Substance Agreement - Scan on 12/23/2014  2:52 PM: Controlled Medication Agreement 12/12/14 (below)       Patient-Level CSA:    Controlled Substance Agreement - Non - Opioid - Scan on 3/4/2019 10:27 AM: NON-OPIOID CONTROLLED SUBSTANCE AGREEMENT (below)           Problem List Complete:  Yes    Patient is followed by EARL JORDAN for ongoing prescription of stimulants.  All refills should be approved by this provider, or covering partner.     Medication(s): adderall 10mg twice daily.   Maximum quantity per month: 60  Clinic visit frequency required: Q 6  months      Controlled substance agreement on file: Yes       Date(s): 2/27/2019  Neuropsych evaluation for ADD completed:  Yes, completed by psychiatry while livinig in [place] per pt report, not on file Pt will try to bring copy to clinic or see if he can obtain so we can scan.     Last [facility] website verification:  done on 2/27/2019  [email or web address]    [email or web address]   checked in past 3 months?  Yes 2/27/2019    Last Urine Drug Screen: No results found for: [place] MAYBERRY Drug Analysis UR   Date Value Ref Range Status   05/08/2017   Final     FINAL  (Note)  ====================================================================  COMPREHENSIVE DRUG ANALYSIS,UR  ====================================================================  Test                             Result       Flag       Units    Drug Present   Amphetamine                    2102                    ng/mg creat    Amphetamine is available as a schedule II prescription drug.     Lidocaine                      PRESENT  ====================================================================  Test                      Result    Flag   Units      Ref Range   Creatinine              149              mg/dL      >=20  ====================================================================  For clinical consultation, please call [phone].  ====================================================================  Analysis performed by [employer] [place] [address]     , No results found for: THC13, PCP13, COC13, MAMP13, OPI13, AMP13, BZO13, TCA13, MTD13, BAR13, OXY13, PPX13, BUP13     Processing:  Not Noted

## 2019-05-30 DIAGNOSIS — F90.0 ATTENTION DEFICIT HYPERACTIVITY DISORDER (ADHD), PREDOMINANTLY INATTENTIVE TYPE: ICD-10-CM

## 2019-05-30 RX ORDER — DEXTROAMPHETAMINE SACCHARATE, AMPHETAMINE ASPARTATE, DEXTROAMPHETAMINE SULFATE AND AMPHETAMINE SULFATE 2.5; 2.5; 2.5; 2.5 MG/1; MG/1; MG/1; MG/1
10 TABLET ORAL 2 TIMES DAILY
Qty: 60 TABLET | Refills: 0 | OUTPATIENT
Start: 2019-05-30

## 2019-05-30 NOTE — TELEPHONE ENCOUNTER
Reason for Call:  Medication or medication refill:    Do you use a [facility]?  Name of the pharmacy and phone number for the current request:  [id number] - [place], [place] - [address] AT [facility] OF [address]    Name of the medication requested: amphetamine-dextroamphetamine (ADDERALL) 10 MG tablet   amphetamine-dextroamphetamine (ADDERALL) 10 MG tablet     Other request:     Can we leave a detailed message on this number? YES    Phone number patient can be reached at: [phone]    Best Time: anytime     Call taken on 5/30/2019 at 3:10 PM by Em Jose

## 2019-05-30 NOTE — TELEPHONE ENCOUNTER
Controlled Substance Refill Request for Adderall  Problem List Complete:    Yes    Last Written Prescription Date:  4/30/19  Last Fill Quantity: 60,   # refills: 0    THE MOST RECENT OFFICE VISIT MUST BE WITHIN THE PAST 3 MONTHS. AT LEAST ONE FACE TO FACE VISIT MUST OCCUR EVERY 6 MONTHS. ADDITIONAL VISITS CAN BE VIRTUAL.  (THIS STATEMENT SHOULD BE DELETED.)    Last Office Visit with [facility] primary care provider: 2/27/19    Future Office visit:     Controlled substance agreement:   Encounter-Level CSA - 06/08/2016:    Controlled Substance Agreement - Scan on 6/15/2016 11:03 AM: CONTROLLED SUBSTANCE AGREEMENT (below)       Encounter-Level CSA - 12/12/2014:    Controlled Substance Agreement - Scan on 12/23/2014  2:52 PM: Controlled Medication Agreement 12/12/14 (below)       Patient-Level CSA:    Controlled Substance Agreement - Non - Opioid - Scan on 3/4/2019 10:27 AM: NON-OPIOID CONTROLLED SUBSTANCE AGREEMENT (below)           Last Urine Drug Screen: No results found for: Jim MAYBERRYen Drug Analysis UR   Date Value Ref Range Status   05/08/2017   Final    FINAL  (Note)  ====================================================================  COMPREHENSIVE DRUG ANALYSIS,UR  ====================================================================  Test                             Result       Flag       Units    Drug Present   Amphetamine                    2102                    ng/mg creat    Amphetamine is available as a schedule II prescription drug.     Lidocaine                      PRESENT  ====================================================================  Test                      Result    Flag   Units      Ref Range   Creatinine              149              mg/dL      >=20  ====================================================================  For clinical consultation, please call [phone].  ====================================================================  Analysis performed by [facility] [facility],  [facility] [place] [address]     , No results found for: THC13, PCP13, COC13, MAMP13, OPI13, AMP13, BZO13, TCA13, MTD13, BAR13, OXY13, PPX13, BUP13     Processing:  Patient will  in clinic    [place] reviewed:  reviewed- no concerns    [place]:  [email or web address]  YANETH Herrera, RN  [facility]

## 2019-05-31 ENCOUNTER — NURSE TRIAGE (OUTPATIENT)
Dept: NURSING | Facility: CLINIC | Age: 39
End: 2019-05-31

## 2019-05-31 ENCOUNTER — E-VISIT (OUTPATIENT)
Dept: FAMILY MEDICINE | Facility: CLINIC | Age: 39
End: 2019-05-31
Payer: COMMERCIAL

## 2019-05-31 DIAGNOSIS — F90.0 ATTENTION DEFICIT HYPERACTIVITY DISORDER (ADHD), PREDOMINANTLY INATTENTIVE TYPE: Primary | ICD-10-CM

## 2019-05-31 PROCEDURE — 99444 ZZC PHYSICIAN ONLINE EVALUATION & MANAGEMENT SERVICE: CPT | Performed by: FAMILY MEDICINE

## 2019-05-31 NOTE — TELEPHONE ENCOUNTER
I read the note from the MD, to set up an E visit. Patient wanted to talk with the RN at the clinic so I connected him to [facility].  Erin Monson RN-[facility]

## 2019-06-01 RX ORDER — DEXTROAMPHETAMINE SACCHARATE, AMPHETAMINE ASPARTATE, DEXTROAMPHETAMINE SULFATE AND AMPHETAMINE SULFATE 2.5; 2.5; 2.5; 2.5 MG/1; MG/1; MG/1; MG/1
10 TABLET ORAL 2 TIMES DAILY
Qty: 60 TABLET | Refills: 0 | Status: SHIPPED | OUTPATIENT
Start: 2019-06-01 | End: 2019-09-26

## 2019-06-01 RX ORDER — DEXTROAMPHETAMINE SACCHARATE, AMPHETAMINE ASPARTATE, DEXTROAMPHETAMINE SULFATE AND AMPHETAMINE SULFATE 2.5; 2.5; 2.5; 2.5 MG/1; MG/1; MG/1; MG/1
10 TABLET ORAL 2 TIMES DAILY
Qty: 60 TABLET | Refills: 0 | Status: SHIPPED | OUTPATIENT
Start: 2019-07-02 | End: 2019-09-26

## 2019-06-01 RX ORDER — DEXTROAMPHETAMINE SACCHARATE, AMPHETAMINE ASPARTATE, DEXTROAMPHETAMINE SULFATE AND AMPHETAMINE SULFATE 2.5; 2.5; 2.5; 2.5 MG/1; MG/1; MG/1; MG/1
10 TABLET ORAL 2 TIMES DAILY
Qty: 60 TABLET | Refills: 0 | Status: SHIPPED | OUTPATIENT
Start: 2019-08-02 | End: 2019-08-30

## 2019-06-01 NOTE — TELEPHONE ENCOUNTER
Was seen in UC in [place] earlier this month.  BP OK.  Weight down 7 lbs from February to May, though different scales used.  Will give three 1 month refills.  Will need to be seen face-to-face in 3 months.    Umesh Gruber MD

## 2019-08-29 DIAGNOSIS — F90.0 ATTENTION DEFICIT HYPERACTIVITY DISORDER (ADHD), PREDOMINANTLY INATTENTIVE TYPE: ICD-10-CM

## 2019-08-29 NOTE — TELEPHONE ENCOUNTER
Requested Prescriptions   Pending Prescriptions Disp Refills     amphetamine-dextroamphetamine (ADDERALL) 10 MG tablet  Last Written Prescription Date:  08/02/2019  Last Fill Quantity: 60 tablet,  # refills: 0   Last Office Visit: 2/27/2019 Perla Quezada PA-C   Future Office Visit:    Next 5 appointments (look out 90 days)    Sep 12, 2019  3:20 PM CDT  Office Visit with Ramírez Watson DO  [facility] [address] [facility] [id number] [phone]          60 tablet 0     Sig: Take 1 tablet (10 mg) by mouth 2 times daily       There is no refill protocol information for this order     Routing refill request to provider for review/approval because:  Drug not on the [facility], [facility] or [facility] refill protocol or controlled substance

## 2019-08-29 NOTE — TELEPHONE ENCOUNTER
Reason for call:  Medication   If this is a refill request, has the caller requested the refill from the pharmacy already? No  Will the patient be using a [facility]? No  Name of the pharmacy and phone number for the current request: Will be picking up hard copy at clinic    Name of the medication requested: amphetamine-dextroamphetamine (ADDERALL) 10 MG tablet    Other request: would like a refill until next appt     Phone number to reach patient:  Cell number on file:    Telephone Information:   Mobile [phone]       Best Time:  any    Can we leave a detailed message on this number?  YES

## 2019-08-30 RX ORDER — DEXTROAMPHETAMINE SACCHARATE, AMPHETAMINE ASPARTATE, DEXTROAMPHETAMINE SULFATE AND AMPHETAMINE SULFATE 2.5; 2.5; 2.5; 2.5 MG/1; MG/1; MG/1; MG/1
10 TABLET ORAL 2 TIMES DAILY
Qty: 60 TABLET | Refills: 0 | Status: SHIPPED | OUTPATIENT
Start: 2019-08-30 | End: 2019-09-26

## 2019-08-30 NOTE — TELEPHONE ENCOUNTER
30 day refill given as he'll be following up in clinic in about two weeks.  Chart reviewed.  Rx sent to pt's preferred pharmacy.    [id number] [place], [place] - [address] AT [facility] [address]     Umesh Gruber MD

## 2019-09-18 NOTE — PROGRESS NOTES
Jelani Crane is a 39 year old male who presents to clinic today for the following health issues:    HPI   Medication Followup of Adderall    Taking Medication as prescribed: yes    Side Effects:  None    Medication Helping Symptoms:  yes     No appetite changes.  No difficulty with sleeping.      Takes on a consistent basis.  Typically takes 1 tablet before class in the morning and then around 2-3 p.m.     Is in school for [employer] in [place].    Is continuing to work in [employer]) while in school.      Patient Active Problem List   Diagnosis     CARDIOVASCULAR SCREENING; LDL GOAL LESS THAN 160     Attention deficit hyperactivity disorder (ADHD), predominantly inattentive type     Environmental allergies     Dyshidrotic eczema     Controlled substance agreement signed     Past Surgical History:   Procedure Laterality Date     NO HISTORY OF SURGERY         Social History     Tobacco Use     Smoking status: Never Smoker     Smokeless tobacco: Never Used   Substance Use Topics     Alcohol use: Yes     Family History   Problem Relation Age of Onset     Hypertension Mother      Diabetes Mother         type 2     Cerebrovascular Disease Mother 60        non-smoker     Coronary Artery Disease Father 60        MI, non-smoker     Breast Cancer No family hx of      Colon Cancer No family hx of      Genetic Disorder No family hx of      Thyroid Disease No family hx of      Mental Illness No family hx of          Current Outpatient Medications   Medication Sig Dispense Refill     [START ON 11/21/2019] amphetamine-dextroamphetamine (ADDERALL) 10 MG tablet Take 1 tablet (10 mg) by mouth 2 times daily 60 tablet 0     clobetasol (TEMOVATE) 0.05 % cream Apply sparingly to affected area twice daily for 14 days.  Do not apply to face. (Patient not taking: Reported on 9/6/2018) 30 g 3     Fexofenadine HCl (ALLEGRA PO)        fluticasone (FLONASE) 50 MCG/ACT nasal spray Spray 1-2 sprays  "into both nostrils daily (Patient not taking: Reported on 9/6/2018) 1 Package 11     No Known Allergies      Reviewed and updated as needed this visit by Provider         Review of Systems   ROS COMP: Constitutional, HEENT, cardiovascular, pulmonary, gi and gu systems are negative, except as otherwise noted.      Objective    /76 (BP Location: Right arm, Patient Position: Sitting, Cuff Size: Adult Regular)   Pulse 95   Temp 98.1  F (36.7  C) (Oral)   Ht 1.702 m (5' 7\")   Wt 73.9 kg (163 lb)   SpO2 98%   BMI 25.53 kg/m    Body mass index is 25.53 kg/m .  Physical Exam   GENERAL: healthy, alert and no distress  RESP: lungs clear to auscultation - no rales, rhonchi or wheezes  CV: regular rate and rhythm, normal S1 S2, no S3 or S4, no murmur, click or rub, no peripheral edema and peripheral pulses strong  PSYCH: mentation appears normal, affect normal/bright          Assessment & Plan     Carlos was seen today for recheck medication and imm/inj.    Diagnoses and all orders for this visit:    Need for prophylactic vaccination and inoculation against influenza  -     INFLUENZA VACCINE IM > 6 MONTHS VALENT IIV4 [58702]  -     Vaccine Administration, Initial [42141]    Attention deficit hyperactivity disorder (ADHD), predominantly inattentive type  Currently well controlled on Adderall, 10 mg 2 times daily for management of ADHD. 3 months worth of refills prescribed.    -     Discontinue: amphetamine-dextroamphetamine (ADDERALL) 10 MG tablet; Take 1 tablet (10 mg) by mouth 2 times daily  -     Discontinue: amphetamine-dextroamphetamine (ADDERALL) 10 MG tablet; Take 1 tablet (10 mg) by mouth 2 times daily  -     amphetamine-dextroamphetamine (ADDERALL) 10 MG tablet; Take 1 tablet (10 mg) by mouth 2 times daily           BMI:   Estimated body mass index is 25.53 kg/m  as calculated from the following:    Height as of this encounter: 1.702 m (5' 7\").    Weight as of this encounter: 73.9 kg (163 lb). "         Return in about 3 months (around 12/26/2019) for ADHD Med Check - E-visit.    SALOMON [facility]

## 2019-09-26 ENCOUNTER — OFFICE VISIT (OUTPATIENT)
Dept: FAMILY MEDICINE | Facility: CLINIC | Age: 39
End: 2019-09-26
Payer: COMMERCIAL

## 2019-09-26 VITALS
BODY MASS INDEX: 25.58 KG/M2 | HEIGHT: 67 IN | OXYGEN SATURATION: 98 % | TEMPERATURE: 98.1 F | DIASTOLIC BLOOD PRESSURE: 76 MMHG | SYSTOLIC BLOOD PRESSURE: 118 MMHG | WEIGHT: 163 LBS | HEART RATE: 95 BPM

## 2019-09-26 DIAGNOSIS — F90.0 ATTENTION DEFICIT HYPERACTIVITY DISORDER (ADHD), PREDOMINANTLY INATTENTIVE TYPE: ICD-10-CM

## 2019-09-26 DIAGNOSIS — Z23 NEED FOR PROPHYLACTIC VACCINATION AND INOCULATION AGAINST INFLUENZA: Primary | ICD-10-CM

## 2019-09-26 PROCEDURE — 90686 IIV4 VACC NO PRSV 0.5 ML IM: CPT | Performed by: NURSE PRACTITIONER

## 2019-09-26 PROCEDURE — 90471 IMMUNIZATION ADMIN: CPT | Performed by: NURSE PRACTITIONER

## 2019-09-26 PROCEDURE — 99213 OFFICE O/P EST LOW 20 MIN: CPT | Mod: 25 | Performed by: NURSE PRACTITIONER

## 2019-09-26 RX ORDER — DEXTROAMPHETAMINE SACCHARATE, AMPHETAMINE ASPARTATE, DEXTROAMPHETAMINE SULFATE AND AMPHETAMINE SULFATE 2.5; 2.5; 2.5; 2.5 MG/1; MG/1; MG/1; MG/1
10 TABLET ORAL 2 TIMES DAILY
Qty: 60 TABLET | Refills: 0 | Status: SHIPPED | OUTPATIENT
Start: 2019-09-26 | End: 2019-09-26

## 2019-09-26 RX ORDER — DEXTROAMPHETAMINE SACCHARATE, AMPHETAMINE ASPARTATE, DEXTROAMPHETAMINE SULFATE AND AMPHETAMINE SULFATE 2.5; 2.5; 2.5; 2.5 MG/1; MG/1; MG/1; MG/1
10 TABLET ORAL 2 TIMES DAILY
Qty: 60 TABLET | Refills: 0 | Status: SHIPPED | OUTPATIENT
Start: 2019-11-21 | End: 2019-12-27

## 2019-09-26 RX ORDER — DEXTROAMPHETAMINE SACCHARATE, AMPHETAMINE ASPARTATE, DEXTROAMPHETAMINE SULFATE AND AMPHETAMINE SULFATE 2.5; 2.5; 2.5; 2.5 MG/1; MG/1; MG/1; MG/1
10 TABLET ORAL 2 TIMES DAILY
Qty: 60 TABLET | Refills: 0 | Status: SHIPPED | OUTPATIENT
Start: 2019-10-24 | End: 2019-09-26

## 2019-09-26 ASSESSMENT — MIFFLIN-ST. JEOR: SCORE: 1612.99

## 2019-12-21 ENCOUNTER — E-VISIT (OUTPATIENT)
Dept: FAMILY MEDICINE | Facility: CLINIC | Age: 39
End: 2019-12-21
Payer: COMMERCIAL

## 2019-12-21 DIAGNOSIS — F90.0 ATTENTION DEFICIT HYPERACTIVITY DISORDER (ADHD), PREDOMINANTLY INATTENTIVE TYPE: Primary | ICD-10-CM

## 2019-12-23 ENCOUNTER — E-VISIT (OUTPATIENT)
Dept: FAMILY MEDICINE | Facility: CLINIC | Age: 39
End: 2019-12-23
Payer: COMMERCIAL

## 2019-12-23 DIAGNOSIS — F90.0 ATTENTION DEFICIT HYPERACTIVITY DISORDER (ADHD), PREDOMINANTLY INATTENTIVE TYPE: ICD-10-CM

## 2019-12-23 PROCEDURE — 99444 ZZC PHYSICIAN ONLINE EVALUATION & MANAGEMENT SERVICE: CPT | Performed by: NURSE PRACTITIONER

## 2019-12-27 RX ORDER — DEXTROAMPHETAMINE SACCHARATE, AMPHETAMINE ASPARTATE, DEXTROAMPHETAMINE SULFATE AND AMPHETAMINE SULFATE 3.75; 3.75; 3.75; 3.75 MG/1; MG/1; MG/1; MG/1
15 TABLET ORAL 2 TIMES DAILY
Qty: 60 TABLET | Refills: 0 | Status: SHIPPED | OUTPATIENT
Start: 2019-12-27 | End: 2020-01-24

## 2020-01-24 ENCOUNTER — MYC REFILL (OUTPATIENT)
Dept: FAMILY MEDICINE | Facility: CLINIC | Age: 40
End: 2020-01-24

## 2020-01-24 DIAGNOSIS — F90.0 ATTENTION DEFICIT HYPERACTIVITY DISORDER (ADHD), PREDOMINANTLY INATTENTIVE TYPE: ICD-10-CM

## 2020-01-24 NOTE — TELEPHONE ENCOUNTER
Requested Prescriptions   Pending Prescriptions Disp Refills     amphetamine-dextroamphetamine (ADDERALL) 15 MG tablet 60 tablet 0     Sig: Take 1 tablet (15 mg) by mouth 2 times daily   Last Written Prescription Date:  12/27/2019  Last Fill Quantity: 60,  # refills: 0   Last office visit: 9/26/2019 with prescribing provider:  Camila   Future Office Visit:        There is no refill protocol information for this order        Overview Addendum 12/23/2019  3:12 PM by Day, GRECIA Tamez    checked 12/23/19.     Routing refill request to provider for review/approval because:  Drug not on the [facility] refill protocol     YANETH Belcher, RN  [facility]

## 2020-01-25 ENCOUNTER — MYC REFILL (OUTPATIENT)
Dept: FAMILY MEDICINE | Facility: CLINIC | Age: 40
End: 2020-01-25

## 2020-01-25 DIAGNOSIS — F90.0 ATTENTION DEFICIT HYPERACTIVITY DISORDER (ADHD), PREDOMINANTLY INATTENTIVE TYPE: ICD-10-CM

## 2020-01-25 RX ORDER — DEXTROAMPHETAMINE SACCHARATE, AMPHETAMINE ASPARTATE, DEXTROAMPHETAMINE SULFATE AND AMPHETAMINE SULFATE 3.75; 3.75; 3.75; 3.75 MG/1; MG/1; MG/1; MG/1
15 TABLET ORAL 2 TIMES DAILY
Qty: 60 TABLET | Refills: 0 | Status: CANCELLED | OUTPATIENT
Start: 2020-01-25

## 2020-01-27 ENCOUNTER — MYC REFILL (OUTPATIENT)
Dept: FAMILY MEDICINE | Facility: CLINIC | Age: 40
End: 2020-01-27

## 2020-01-27 DIAGNOSIS — F90.0 ATTENTION DEFICIT HYPERACTIVITY DISORDER (ADHD), PREDOMINANTLY INATTENTIVE TYPE: ICD-10-CM

## 2020-01-27 RX ORDER — DEXTROAMPHETAMINE SACCHARATE, AMPHETAMINE ASPARTATE, DEXTROAMPHETAMINE SULFATE AND AMPHETAMINE SULFATE 3.75; 3.75; 3.75; 3.75 MG/1; MG/1; MG/1; MG/1
15 TABLET ORAL 2 TIMES DAILY
Qty: 60 TABLET | Refills: 0 | Status: SHIPPED | OUTPATIENT
Start: 2020-01-27 | End: 2020-02-24

## 2020-01-27 RX ORDER — DEXTROAMPHETAMINE SACCHARATE, AMPHETAMINE ASPARTATE, DEXTROAMPHETAMINE SULFATE AND AMPHETAMINE SULFATE 3.75; 3.75; 3.75; 3.75 MG/1; MG/1; MG/1; MG/1
15 TABLET ORAL 2 TIMES DAILY
Qty: 60 TABLET | Refills: 0 | Status: CANCELLED | OUTPATIENT
Start: 2020-01-27

## 2020-01-27 NOTE — TELEPHONE ENCOUNTER
Controlled Substance Refill Request for:  amphetamine-dextroamphetamine (ADDERALL) 15 MG tablet    Last Written Prescription Date:  12/27/2019  Last Fill Quantity: 60 tablet,   # refills: 0    Last Office Visit with [facility] primary care provider: 9/26/2019-Camila  THE MOST RECENT OFFICE VISIT MUST BE WITHIN THE PAST 3 MONTHS. (AT LEAST ONE FACE TO FACE VISIT MUST OCCUR EVERY 6 MONTHS. ADDITIONAL VISITS CAN BE VIRTUAL.)    Future Office visit:     Controlled substance agreement:   Encounter-Level CSA - 06/08/2016:    Controlled Substance Agreement - Scan on 6/15/2016 11:03 AM: CONTROLLED SUBSTANCE AGREEMENT     Encounter-Level CSA - 12/12/2014:    Controlled Substance Agreement - Scan on 12/23/2014  2:52 PM: Controlled Medication Agreement 12/12/14     Patient-Level CSA:    Controlled Substance Agreement - Non - Opioid - Scan on 3/4/2019 10:27 AM: NON-OPIOID CONTROLLED SUBSTANCE AGREEMENT         Problem List Complete:  Yes    Patient is followed by EARL JORDAN for ongoing prescription of stimulants.  All refills should be approved by this provider, or covering partner.     Medication(s): adderall 10mg twice daily.   Maximum quantity per month: 60  Clinic visit frequency required: Q 6  months      Controlled substance agreement on file: Yes       Date(s): 2/27/2019  Neuropsych evaluation for ADD completed:  Yes, completed by psychiatry while [facility] in [place] per pt report, not on file Pt will try to bring copy to clinic or see if he can obtain so we can scan.     Last [facility] website verification:  12/23/19  [email or web address]    [email or web address]   checked in past 3 months?  Yes 12/23/2019    Last Urine Drug Screen: No results found for: [place] MAYBERRY Drug Analysis UR   Date Value Ref Range Status   05/08/2017   Final    FINAL  (Note)  ====================================================================  COMPREHENSIVE DRUG  ANALYSIS,UR  ====================================================================  Test                             Result       Flag       Units    Drug Present   Amphetamine                    2102                    ng/mg creat    Amphetamine is available as a schedule II prescription drug.     Lidocaine                      PRESENT  ====================================================================  Test                      Result    Flag   Units      Ref Range   Creatinine              149              mg/dL      >=20  ====================================================================  For clinical consultation, please call [phone].  ====================================================================  Analysis performed by [facility] [place] [address]     , No results found for: THC13, PCP13, COC13, MAMP13, OPI13, AMP13, BZO13, TCA13, MTD13, BAR13, OXY13, PPX13, BUP13     Processing:  Rx to be electronically transmitted to pharmacy by provider

## 2020-01-27 NOTE — TELEPHONE ENCOUNTER
Duplicate request. [employer] message sent to patient advising that original request is in process.  YANETH Herrera, RN  [facility]

## 2020-01-27 NOTE — TELEPHONE ENCOUNTER
Overview Addendum 12/23/2019  3:12 PM by Joi Herndon RN    checked 12/23/19.   Previous Version     Routing refill request to provider for review/approval because:  Drug not on the [facility] refill protocol     YANETH Belcher, RN  [facility]

## 2020-02-24 ENCOUNTER — MYC REFILL (OUTPATIENT)
Dept: FAMILY MEDICINE | Facility: CLINIC | Age: 40
End: 2020-02-24

## 2020-02-24 DIAGNOSIS — F90.0 ATTENTION DEFICIT HYPERACTIVITY DISORDER (ADHD), PREDOMINANTLY INATTENTIVE TYPE: ICD-10-CM

## 2020-02-25 RX ORDER — DEXTROAMPHETAMINE SACCHARATE, AMPHETAMINE ASPARTATE, DEXTROAMPHETAMINE SULFATE AND AMPHETAMINE SULFATE 3.75; 3.75; 3.75; 3.75 MG/1; MG/1; MG/1; MG/1
15 TABLET ORAL 2 TIMES DAILY
Qty: 60 TABLET | Refills: 0 | Status: SHIPPED | OUTPATIENT
Start: 2020-02-25 | End: 2020-06-26

## 2020-02-25 NOTE — TELEPHONE ENCOUNTER
See below. Patient is requesting this be sent to an out of state pharmacy. Not sure this can be done due to varying of state laws. Please advise. Thank you.  Joi Herndon, VIKYN, RN  [facility]

## 2020-02-25 NOTE — TELEPHONE ENCOUNTER
Controlled Substance Refill Request for:  amphetamine-dextroamphetamine (ADDERALL) 15 MG tablet    Last Written Prescription Date:  1/27/2020  Last Fill Quantity: 60 tablet,   # refills: 0    Last Office Visit with [facility] primary care provider: 9/26/2019-Camila  THE MOST RECENT OFFICE VISIT MUST BE WITHIN THE PAST 3 MONTHS. (AT LEAST ONE FACE TO FACE VISIT MUST OCCUR EVERY 6 MONTHS. ADDITIONAL VISITS CAN BE VIRTUAL.)    Future Office visit:     Controlled substance agreement:   Encounter-Level CSA - 06/08/2016:    Controlled Substance Agreement - Scan on 6/15/2016 11:03 AM: CONTROLLED SUBSTANCE AGREEMENT     Encounter-Level CSA - 12/12/2014:    Controlled Substance Agreement - Scan on 12/23/2014  2:52 PM: Controlled Medication Agreement 12/12/14     Patient-Level CSA:    Controlled Substance Agreement - Non - Opioid - Scan on 3/4/2019 10:27 AM: NON-OPIOID CONTROLLED SUBSTANCE AGREEMENT         Problem List Complete:  Yes    Patient is followed by EARL JORDAN for ongoing prescription of stimulants.  All refills should be approved by this provider, or covering partner.     Medication(s): adderall 10mg twice daily.   Maximum quantity per month: 60  Clinic visit frequency required: Q 6  months      Controlled substance agreement on file: Yes       Date(s): 2/27/2019  Neuropsych evaluation for ADD completed:  Yes, completed by psychiatry while [facility] in [place] per pt report, not on file Pt will try to bring copy to clinic or see if he can obtain so we can scan.     Last [facility] website verification:  12/23/19  [email or web address]    [email or web address]   checked in past 3 months?  Yes 12/23/2019    Last Urine Drug Screen: No results found for: [place] MAYBERRY Drug Analysis UR   Date Value Ref Range Status   05/08/2017   Final    FINAL  (Note)  ====================================================================  COMPREHENSIVE DRUG  ANALYSIS,UR  ====================================================================  Test                             Result       Flag       Units    Drug Present   Amphetamine                    2102                    ng/mg creat    Amphetamine is available as a schedule II prescription drug.     Lidocaine                      PRESENT  ====================================================================  Test                      Result    Flag   Units      Ref Range   Creatinine              149              mg/dL      >=20  ====================================================================  For clinical consultation, please call [phone].  ====================================================================  Analysis performed by [employer] [place] [address]     , No results found for: THC13, PCP13, COC13, MAMP13, OPI13, AMP13, BZO13, TCA13, MTD13, BAR13, OXY13, PPX13, BUP13     Processing:  Rx to be electronically transmitted to pharmacy by provider

## 2020-03-02 ENCOUNTER — HEALTH MAINTENANCE LETTER (OUTPATIENT)
Age: 40
End: 2020-03-02

## 2020-03-24 ENCOUNTER — MYC REFILL (OUTPATIENT)
Dept: FAMILY MEDICINE | Facility: CLINIC | Age: 40
End: 2020-03-24

## 2020-03-24 DIAGNOSIS — F90.0 ATTENTION DEFICIT HYPERACTIVITY DISORDER (ADHD), PREDOMINANTLY INATTENTIVE TYPE: ICD-10-CM

## 2020-03-24 DIAGNOSIS — Z79.899 CONTROLLED SUBSTANCE AGREEMENT SIGNED: ICD-10-CM

## 2020-03-24 RX ORDER — DEXTROAMPHETAMINE SACCHARATE, AMPHETAMINE ASPARTATE, DEXTROAMPHETAMINE SULFATE AND AMPHETAMINE SULFATE 3.75; 3.75; 3.75; 3.75 MG/1; MG/1; MG/1; MG/1
15 TABLET ORAL 2 TIMES DAILY
Qty: 60 TABLET | Refills: 0 | Status: CANCELLED | OUTPATIENT
Start: 2020-03-24

## 2020-03-25 NOTE — TELEPHONE ENCOUNTER
Controlled Substance Refill Request for:  amphetamine-dextroamphetamine (ADDERALL) 15 MG tablet     Last Written Prescription Date:  2/25/2020  Last Fill Quantity: 60 tablet,   # refills: 0    [place] [facility] [place] care provider: 9/26/2019/Camila  THE MOST RECENT OFFICE VISIT MUST BE WITHIN THE PAST 3 MONTHS. (AT LEAST ONE FACE TO FACE VISIT MUST OCCUR EVERY 6 MONTHS. ADDITIONAL VISITS CAN BE VIRTUAL.)    Future Office visit:     Controlled substance agreement:   Encounter-Level CSA - 06/08/2016:    Controlled Substance Agreement - Scan on 6/15/2016 11:03 AM: CONTROLLED SUBSTANCE AGREEMENT     Encounter-Level CSA - 12/12/2014:    Controlled Substance Agreement - Scan on 12/23/2014  2:52 PM: Controlled Medication Agreement 12/12/14     Patient-Level CSA:    Controlled Substance Agreement - Non - Opioid - Scan on 3/4/2019 10:27 AM: NON-OPIOID CONTROLLED SUBSTANCE AGREEMENT         Problem List Complete:  Yes    Patient is followed by EARL JORDAN for ongoing prescription of stimulants.  All refills should be approved by this provider, or covering partner.     Medication(s): adderall 10mg twice daily.   Maximum quantity per month: 60  Clinic visit frequency required: Q 6  months      Controlled substance agreement on file: Yes       Date(s): 2/27/2019  Neuropsych evaluation for ADD completed:  Yes, completed by psychiatry while [facility] in [place] per pt report, not on file Pt will try to bring copy to clinic or see if he can obtain so we can scan.     Last [facility] website verification:  12/23/19  [email or web address]    [email or web address]   checked in past 3 months?  No, route to RN    Last Urine Drug Screen: No results found for: [place] MAYBERRY Drug Analysis UR   Date Value Ref Range Status   05/08/2017   Final    FINAL  (Note)  ====================================================================  COMPREHENSIVE DRUG  ANALYSIS,UR  ====================================================================  Test                             Result       Flag       Units    Drug Present   Amphetamine                    2102                    ng/mg creat    Amphetamine is available as a schedule II prescription drug.     Lidocaine                      PRESENT  ====================================================================  Test                      Result    Flag   Units      Ref Range   Creatinine              149              mg/dL      >=20  ====================================================================  For clinical consultation, please call [phone].  ====================================================================  Analysis performed by [facility] [place] [address]     , No results found for: THC13, PCP13, COC13, MAMP13, OPI13, AMP13, BZO13, TCA13, MTD13, BAR13, OXY13, PPX13, BUP13     Processing:  Rx to be electronically transmitted to pharmacy by provider

## 2020-03-26 NOTE — TELEPHONE ENCOUNTER
RX monitoring [place] ([place]) reviewed:  reviewed- no concerns    [place] profile:  [email or web address]

## 2020-03-30 RX ORDER — DEXTROAMPHETAMINE SACCHARATE, AMPHETAMINE ASPARTATE, DEXTROAMPHETAMINE SULFATE AND AMPHETAMINE SULFATE 3.75; 3.75; 3.75; 3.75 MG/1; MG/1; MG/1; MG/1
15 TABLET ORAL 2 TIMES DAILY
Qty: 60 TABLET | Refills: 0 | Status: SHIPPED | OUTPATIENT
Start: 2020-05-31 | End: 2020-06-26

## 2020-03-30 RX ORDER — DEXTROAMPHETAMINE SACCHARATE, AMPHETAMINE ASPARTATE, DEXTROAMPHETAMINE SULFATE AND AMPHETAMINE SULFATE 3.75; 3.75; 3.75; 3.75 MG/1; MG/1; MG/1; MG/1
15 TABLET ORAL 2 TIMES DAILY
Qty: 60 TABLET | Refills: 0 | Status: SHIPPED | OUTPATIENT
Start: 2020-04-30 | End: 2020-06-26

## 2020-03-30 RX ORDER — DEXTROAMPHETAMINE SACCHARATE, AMPHETAMINE ASPARTATE, DEXTROAMPHETAMINE SULFATE AND AMPHETAMINE SULFATE 3.75; 3.75; 3.75; 3.75 MG/1; MG/1; MG/1; MG/1
15 TABLET ORAL 2 TIMES DAILY
Qty: 60 TABLET | Refills: 0 | Status: SHIPPED | OUTPATIENT
Start: 2020-03-30 | End: 2020-06-26

## 2020-03-31 NOTE — TELEPHONE ENCOUNTER
Given coronavirus concerns will give 3 month refill.  Should be seen for next refill.  Of note, pharmacy is located in [place].  If he's now living in [place] and cannot follow up for physical visit with us here in [place] he should consider establishing care with a provider closer to his residence in [place].  Please call patient.     Chart reviewed.  Rx sent to pt's preferred pharmacy.       [facility] #[id number] - [place], [place] - [address] [facility] AT  [address]    Umesh Gruber MD

## 2020-05-19 ENCOUNTER — MYC REFILL (OUTPATIENT)
Dept: FAMILY MEDICINE | Facility: CLINIC | Age: 40
End: 2020-05-19

## 2020-05-19 DIAGNOSIS — L30.1 DYSHIDROTIC ECZEMA: ICD-10-CM

## 2020-05-19 RX ORDER — CLOBETASOL PROPIONATE 0.5 MG/G
CREAM TOPICAL
Qty: 30 G | Refills: 3 | OUTPATIENT
Start: 2020-05-19

## 2020-06-25 ENCOUNTER — E-VISIT (OUTPATIENT)
Dept: FAMILY MEDICINE | Facility: CLINIC | Age: 40
End: 2020-06-25
Payer: COMMERCIAL

## 2020-06-25 ENCOUNTER — MYC REFILL (OUTPATIENT)
Dept: FAMILY MEDICINE | Facility: CLINIC | Age: 40
End: 2020-06-25

## 2020-06-25 DIAGNOSIS — F90.0 ATTENTION DEFICIT HYPERACTIVITY DISORDER (ADHD), PREDOMINANTLY INATTENTIVE TYPE: ICD-10-CM

## 2020-06-25 DIAGNOSIS — F90.0 ATTENTION DEFICIT HYPERACTIVITY DISORDER (ADHD), PREDOMINANTLY INATTENTIVE TYPE: Primary | ICD-10-CM

## 2020-06-25 PROCEDURE — 99207 ZZC NO BILLABLE SERVICE THIS VISIT: CPT | Performed by: FAMILY MEDICINE

## 2020-06-25 RX ORDER — DEXTROAMPHETAMINE SACCHARATE, AMPHETAMINE ASPARTATE, DEXTROAMPHETAMINE SULFATE AND AMPHETAMINE SULFATE 3.75; 3.75; 3.75; 3.75 MG/1; MG/1; MG/1; MG/1
15 TABLET ORAL 2 TIMES DAILY
Qty: 60 TABLET | Refills: 0 | Status: CANCELLED | OUTPATIENT
Start: 2020-06-25

## 2020-06-26 RX ORDER — DEXTROAMPHETAMINE SACCHARATE, AMPHETAMINE ASPARTATE, DEXTROAMPHETAMINE SULFATE AND AMPHETAMINE SULFATE 3.75; 3.75; 3.75; 3.75 MG/1; MG/1; MG/1; MG/1
15 TABLET ORAL 2 TIMES DAILY
Qty: 60 TABLET | Refills: 0 | Status: SHIPPED | OUTPATIENT
Start: 2020-08-26 | End: 2021-01-14

## 2020-06-26 RX ORDER — DEXTROAMPHETAMINE SACCHARATE, AMPHETAMINE ASPARTATE, DEXTROAMPHETAMINE SULFATE AND AMPHETAMINE SULFATE 3.75; 3.75; 3.75; 3.75 MG/1; MG/1; MG/1; MG/1
15 TABLET ORAL 2 TIMES DAILY
Qty: 60 TABLET | Refills: 0 | Status: SHIPPED | OUTPATIENT
Start: 2020-07-26 | End: 2021-01-14

## 2020-06-26 RX ORDER — DEXTROAMPHETAMINE SACCHARATE, AMPHETAMINE ASPARTATE, DEXTROAMPHETAMINE SULFATE AND AMPHETAMINE SULFATE 3.75; 3.75; 3.75; 3.75 MG/1; MG/1; MG/1; MG/1
15 TABLET ORAL 2 TIMES DAILY
Qty: 60 TABLET | Refills: 0 | Status: SHIPPED | OUTPATIENT
Start: 2020-06-26 | End: 2021-01-14

## 2020-06-26 NOTE — TELEPHONE ENCOUNTER
Routing refill request to provider for review/approval because:  Drug not on the [facility] refill protocol     Requested Prescriptions   Pending Prescriptions Disp Refills     amphetamine-dextroamphetamine (ADDERALL) 15 MG tablet 60 tablet 0     Sig: Take 1 tablet (15 mg) by mouth 2 times daily       There is no refill protocol information for this order        Last Written Prescription Date:  05/31/20  Last Fill Quantity: 60,  # refills: 0   Last office visit: 9/26/2019 with prescribing provider:     Future Office Visit:

## 2020-09-21 ENCOUNTER — MYC REFILL (OUTPATIENT)
Dept: FAMILY MEDICINE | Facility: CLINIC | Age: 40
End: 2020-09-21

## 2020-09-21 DIAGNOSIS — F90.0 ATTENTION DEFICIT HYPERACTIVITY DISORDER (ADHD), PREDOMINANTLY INATTENTIVE TYPE: ICD-10-CM

## 2020-09-21 RX ORDER — DEXTROAMPHETAMINE SACCHARATE, AMPHETAMINE ASPARTATE, DEXTROAMPHETAMINE SULFATE AND AMPHETAMINE SULFATE 3.75; 3.75; 3.75; 3.75 MG/1; MG/1; MG/1; MG/1
15 TABLET ORAL 2 TIMES DAILY
Qty: 60 TABLET | Refills: 0 | Status: CANCELLED | OUTPATIENT
Start: 2020-09-21

## 2020-09-22 RX ORDER — DEXTROAMPHETAMINE SACCHARATE, AMPHETAMINE ASPARTATE, DEXTROAMPHETAMINE SULFATE AND AMPHETAMINE SULFATE 3.75; 3.75; 3.75; 3.75 MG/1; MG/1; MG/1; MG/1
15 TABLET ORAL 2 TIMES DAILY
Qty: 60 TABLET | Refills: 0 | Status: SHIPPED | OUTPATIENT
Start: 2020-10-23 | End: 2020-11-22

## 2020-09-22 RX ORDER — DEXTROAMPHETAMINE SACCHARATE, AMPHETAMINE ASPARTATE, DEXTROAMPHETAMINE SULFATE AND AMPHETAMINE SULFATE 3.75; 3.75; 3.75; 3.75 MG/1; MG/1; MG/1; MG/1
15 TABLET ORAL 2 TIMES DAILY
Qty: 60 TABLET | Refills: 0 | Status: SHIPPED | OUTPATIENT
Start: 2020-11-23 | End: 2020-12-16

## 2020-09-22 RX ORDER — DEXTROAMPHETAMINE SACCHARATE, AMPHETAMINE ASPARTATE, DEXTROAMPHETAMINE SULFATE AND AMPHETAMINE SULFATE 3.75; 3.75; 3.75; 3.75 MG/1; MG/1; MG/1; MG/1
15 TABLET ORAL 2 TIMES DAILY
Qty: 60 TABLET | Refills: 0 | Status: SHIPPED | OUTPATIENT
Start: 2020-09-22 | End: 2020-10-22

## 2020-09-22 NOTE — TELEPHONE ENCOUNTER
Chart reviewed.  Rx sent to pt's preferred pharmacy.       [facility] #[id number] - [place], [place] - [address] AT [facility] [address]  [facility] #[id number] - [address] [address] - [address] AT  [address]    Umesh Gruber MD

## 2020-09-22 NOTE — TELEPHONE ENCOUNTER
Amphetamine/dextroamphetamine 15 mg      Last Written Prescription Date:  8/26/20  Last Fill Quantity: 60,   # refills: 0  Last Office Visit: 6/25/20 e-visit Gruber  Future Office visit:       Routing refill request to provider for review/approval because:  Drug not on the [facility], [facility] or  Health refill protocol or controlled substance    RX monitoring program ([place]) reviewed:  not reviewed/not due - last done on 3/26/20    [place] profile:  [email or web address]    Abbey DUQUE RN  EP Triage

## 2020-12-16 ENCOUNTER — MYC REFILL (OUTPATIENT)
Dept: FAMILY MEDICINE | Facility: CLINIC | Age: 40
End: 2020-12-16

## 2020-12-16 DIAGNOSIS — F90.0 ATTENTION DEFICIT HYPERACTIVITY DISORDER (ADHD), PREDOMINANTLY INATTENTIVE TYPE: ICD-10-CM

## 2020-12-16 NOTE — LETTER
[facility]  [address] [facility] [id number] [phone]       January 6, 2021    Carlos Crane  [address] [address] [place] [id number]    Carlos:    We have been calling you regarding a recent refill request we received for Adderall.  Unfortunately, we were unable to reach you.  We are notifying you that you are due for an in clinic office visit  prior to your next refill.  You can schedule this appointment via [employer] or by calling the clinic at [phone].        Sincerely,      Dr. Umesh Gruber

## 2020-12-17 NOTE — TELEPHONE ENCOUNTER
Controlled Substance Refill Request for amphetamine-dextroamphetamine (ADDERALL) 15 MG tablet  Problem List Complete:    Yes    Last Written Prescription Date:  9-22-20  Last Fill Quantity: 60 tablet,   # refills: 0      Last Office Visit with [facility] primary care provider: 6-25-20    Future Office visit:     Controlled substance agreement:   Encounter-Level CSA - 06/08/2016:    Controlled Substance Agreement - Scan on 6/15/2016 11:03 AM: CONTROLLED SUBSTANCE AGREEMENT     Encounter-Level CSA - 12/12/2014:    Controlled Substance Agreement - Scan on 12/23/2014  2:52 PM: Controlled Medication Agreement 12/12/14     Patient-Level CSA:    Controlled Substance Agreement - Non - Opioid - Scan on 3/4/2019 10:27 AM: NON-OPIOID CONTROLLED SUBSTANCE AGREEMENT         Last Urine Drug Screen: No results found for: [place]a MAYBERRY Drug Analysis UR   Date Value Ref Range Status   05/08/2017   Final    FINAL  (Note)  ====================================================================  COMPREHENSIVE DRUG ANALYSIS,UR  ====================================================================  Test                             Result       Flag       Units    Drug Present   Amphetamine                    2102                    ng/mg creat    Amphetamine is available as a schedule II prescription drug.     Lidocaine                      PRESENT  ====================================================================  Test                      Result    Flag   Units      Ref Range   Creatinine              149              mg/dL      >=20  ====================================================================  For clinical consultation, please call [phone].  ====================================================================  Analysis performed by [facility] [place] [address]     , No results found for: THC13, PCP13, COC13, MAMP13, OPI13, AMP13, BZO13, TCA13, MTD13, BAR13, OXY13, PPX13, BUP13     Processing:  Fax Rx to  listed pharmacy    [email or web address]   checked in past 3 months?  No, route to RN

## 2020-12-18 NOTE — TELEPHONE ENCOUNTER
Routing refill request to provider for review/approval because:  Drug not on the [facility] refill protocol     Geovany TOMAS RN   [facility]

## 2020-12-19 RX ORDER — DEXTROAMPHETAMINE SACCHARATE, AMPHETAMINE ASPARTATE, DEXTROAMPHETAMINE SULFATE AND AMPHETAMINE SULFATE 3.75; 3.75; 3.75; 3.75 MG/1; MG/1; MG/1; MG/1
15 TABLET ORAL 2 TIMES DAILY
Qty: 60 TABLET | Refills: 0 | Status: SHIPPED | OUTPATIENT
Start: 2020-12-19 | End: 2021-01-14

## 2020-12-19 NOTE — TELEPHONE ENCOUNTER
V needs an in-person visit every six months per recommendations for chronic controlled substances.  I'll give him one month of refills then he should be seen by me or someone else.  Needs updated CSA, drug screen, HIV & Hep C screening.  Please call patient.     Chart reviewed.  Rx sent to pt's preferred pharmacy.       [facility] #[id number] - [place], [place] - [address] AT [facility] [address]  [id number] [place] [place] - [address] AT  [address]    Umesh Gruber MD

## 2020-12-20 ENCOUNTER — HEALTH MAINTENANCE LETTER (OUTPATIENT)
Age: 40
End: 2020-12-20

## 2021-01-14 ENCOUNTER — OFFICE VISIT (OUTPATIENT)
Dept: FAMILY MEDICINE | Facility: CLINIC | Age: 41
End: 2021-01-14

## 2021-01-14 VITALS
OXYGEN SATURATION: 99 % | HEIGHT: 67 IN | WEIGHT: 180 LBS | DIASTOLIC BLOOD PRESSURE: 80 MMHG | BODY MASS INDEX: 28.25 KG/M2 | TEMPERATURE: 96.6 F | SYSTOLIC BLOOD PRESSURE: 132 MMHG | HEART RATE: 109 BPM

## 2021-01-14 DIAGNOSIS — F90.0 ATTENTION DEFICIT HYPERACTIVITY DISORDER (ADHD), PREDOMINANTLY INATTENTIVE TYPE: ICD-10-CM

## 2021-01-14 PROCEDURE — 80306 DRUG TEST PRSMV INSTRMNT: CPT | Performed by: NURSE PRACTITIONER

## 2021-01-14 RX ORDER — DEXTROAMPHETAMINE SACCHARATE, AMPHETAMINE ASPARTATE, DEXTROAMPHETAMINE SULFATE AND AMPHETAMINE SULFATE 5; 5; 5; 5 MG/1; MG/1; MG/1; MG/1
20 TABLET ORAL 2 TIMES DAILY
Qty: 60 TABLET | Refills: 0 | Status: SHIPPED | OUTPATIENT
Start: 2021-02-12 | End: 2021-03-15

## 2021-01-14 RX ORDER — DEXTROAMPHETAMINE SACCHARATE, AMPHETAMINE ASPARTATE, DEXTROAMPHETAMINE SULFATE AND AMPHETAMINE SULFATE 5; 5; 5; 5 MG/1; MG/1; MG/1; MG/1
20 TABLET ORAL 2 TIMES DAILY
Qty: 60 TABLET | Refills: 0 | Status: SHIPPED | OUTPATIENT
Start: 2021-01-15 | End: 2021-01-14

## 2021-01-14 ASSESSMENT — MIFFLIN-ST. JEOR: SCORE: 1680.1

## 2021-01-14 NOTE — PROGRESS NOTES
"  Assessment & Plan     Carlos was seen today for recheck medication.    Diagnoses and all orders for this visit:    Attention deficit hyperactivity disorder (ADHD), predominantly inattentive type  CSA updated at today's visit.   Trial of dose increase to Adderall 20 mg 2 times daily.  Encouraged drug holidays.    -     Drug Abuse Screen Panel 13, Urine (Pain Care Package)  -     Discontinue: amphetamine-dextroamphetamine (ADDERALL) 20 MG tablet; Take 1 tablet (20 mg) by mouth 2 times daily  -     amphetamine-dextroamphetamine (ADDERALL) 20 MG tablet; Take 1 tablet (20 mg) by mouth 2 times daily      Return in about 2 months (around 3/14/2021) for Preventative visit + fasting labs + ADHD.    Mendy Jensen, SALOMON CNP  [facility]    Subjective     V is a 41 year old who presents to clinic today for the following health issues:      HPI       Medication Followup of amphetamine-dextroamphetamine (ADDERALL) 15 MG tablet    Taking Medication as prescribed: yes    Side Effects:  None    Medication Helping Symptoms:  Not sure if he's getting use to it- doesn't know if its helping - last couple of months.     He feels like it has lost it's affect.      Doesn't do well with Adderall XR.      No chest pain, palpitation.  No insomnia or change in appetite.             Social:  Is still in nursing school - online (school is in [place]).  Is planning to graduate in the spring and would like to continue on to graduate school.    Is working as surgical tech full time.      Review of Systems   Constitutional, HEENT, cardiovascular, pulmonary, gi and gu systems are negative, except as otherwise noted.      Objective    /80   Pulse 109   Temp 96.6  F (35.9  C)   Ht 1.702 m (5' 7\")   Wt 81.6 kg (180 lb)   SpO2 99%   BMI 28.19 kg/m    Body mass index is 28.19 kg/m .  Physical Exam   GENERAL: healthy, alert and no distress  RESP: lungs clear to auscultation - no rales, rhonchi or wheezes  CV: " regular rate and rhythm, normal S1 S2  NEURO: Normal strength and tone, mentation intact and speech normal  PSYCH: mentation appears normal, affect normal/bright

## 2021-01-14 NOTE — LETTER
[facility] [place] [facility]  01/14/21    Patient: Carlos Crane  YOB: 1980  Medical Record Number: [id number]  CSN: [id number]                                                                              Non-opioid Controlled Substance Agreement    I understand that my care provider has prescribed a controlled substance to help manage my condition(s). I am taking this medicine to help me function or work. I know this is strong medicine, and that it can cause serious side effects. Controlled substances can be sedating, addicting and may cause a dependency on the drug. They can affect my ability to drive or think, and cause depression. They need to be taken exactly as prescribed. Combining controlled substances with certain medicines or chemicals (such as cocaine, sedatives and tranquilizers, sleeping pills, meth) can be dangerous or even fatal. Also, if I stop controlled substances suddenly, I may have severe withdrawal symptoms.  If not helpful, I may be asked to stop them.    The risks, benefits, and side effects of these medicine(s) were explained to me. I agree that:    1. I will take part in other treatments as advised by my care team. This may be psychiatry or counseling, physical therapy, behavioral therapy, group treatment or a referral to a pain clinic. I will reduce or stop my medicine when my care team tells me to do so.  2. I will take my medicines as prescribed. I will not change the dose or schedule unless my care team tells me to. There will be no refills if I  run out early.   I may be contactedwithout warning and asked to complete a urine drug test or pill count at any time.   3. I will keep all my appointments, and understand this is part of the monitoring of controlled substances. My care team may require an office visit for EVERY controlled substance refill. If I miss appointments or don t follow instructions, my care team may stop my medicine.  4. I will not ask other  providers to prescribe controlled substances, and I will not accept controlled substances from other people. If I need another prescribed controlled substance for a new reason, I will tell my care team within 1 business day.  5. I will use one pharmacy to fill all of my controlled substance prescriptions, and it is up to me to make sure that I do not run out of my medicines on weekends or holidays. If my care team is willing to refill my controlled substance prescription without a visit, I must request refills only during office hours, refills may take up to 3 days to process, and it may take up to 5 to 7 days for my medicine to be mailed and ready at my pharmacy. Prescriptions will not be mailed anywhere except my pharmacy.    6. I am responsible for my prescriptions. If the medicine/prescription is lost or stolen, it will not be replaced. I also agree not to share controlled substance medicines with anyone.              [facility]  01/14/21  Patient:  Carlos Crane  YOB: 1980  Medical Record Number: [id number]  [facility]: [id number]    7. I agree to not use ANY illegal or recreational drugs. This includes marijuana, cocaine, bath salts or other drugs. I agree not to use alcohol unless my care team says I may. I agree to give urine samples whenever asked. If I don t give a urine sample, the care team may stop my medicine.    8. If I enroll in the [place] Medical Marijuana program, I will tell my care team. I will also sign an agreement to share my medical records with my care team.    9. I will bring in my list of medicines (or my medicine bottles) each time I come to the clinic.   10. I will tell my care team right away if I become pregnant or have a new medical problem treated outside of my regular clinic.  11. I understand that this medicine can affect my thinking and judgment. It may be unsafe for me to drive, use machinery and do dangerous tasks. I will not do any of  these things until I know how the medicine affects me. If my dose changes, I will wait to see how it affects me. I will contact my care team if I have concerns about medicine side effects.    I understand that if I do not follow any of the conditions above, my prescriptions or treatment may be stopped.      I agree that my provider, clinic care team, and pharmacy may work with any city, state or federal law enforcement agency that investigates the misuse, sale, or other diversion of my controlled medicine. I will allow my provider to discuss my care with or share a copy of this agreement with any other treating provider, pharmacy or emergency room where I receive care. I agree to give up (waive) any right of privacy or confidentiality with respect to these consents.   I have read this agreement and have asked questions about anything I did not understand.    ____________________________________________________    ____________  ________  Patient signature                                                         Date      Time    ____________________________________________________     ____________  ________  Witness                                                          Date      Time    ____________________________________________________  Provider signature

## 2021-01-15 LAB
AMPHETAMINES UR QL: ABNORMAL NG/ML
BARBITURATES UR QL SCN: NOT DETECTED NG/ML
BENZODIAZ UR QL SCN: NOT DETECTED NG/ML
BUPRENORPHINE UR QL: NOT DETECTED NG/ML
CANNABINOIDS UR QL: NOT DETECTED NG/ML
COCAINE UR QL SCN: NOT DETECTED NG/ML
D-METHAMPHET UR QL: NOT DETECTED NG/ML
METHADONE UR QL SCN: NOT DETECTED NG/ML
OPIATES UR QL SCN: NOT DETECTED NG/ML
OXYCODONE UR QL SCN: NOT DETECTED NG/ML
PCP UR QL SCN: NOT DETECTED NG/ML
PROPOXYPH UR QL: NOT DETECTED NG/ML
TRICYCLICS UR QL SCN: NOT DETECTED NG/ML

## 2021-01-15 NOTE — RESULT ENCOUNTER NOTE
Dear KEON,     -Drug screen showed expected results.      Please send a [employer] message or call [phone]  if you have any questions.      SALOMON Dee, CNP  [facility]    If you have further questions about the interpretation of your labs, [email or web address] is a good website to check out for further information.

## 2021-03-10 ENCOUNTER — MYC REFILL (OUTPATIENT)
Dept: FAMILY MEDICINE | Facility: CLINIC | Age: 41
End: 2021-03-10

## 2021-03-10 DIAGNOSIS — F90.0 ATTENTION DEFICIT HYPERACTIVITY DISORDER (ADHD), PREDOMINANTLY INATTENTIVE TYPE: ICD-10-CM

## 2021-03-11 RX ORDER — DEXTROAMPHETAMINE SACCHARATE, AMPHETAMINE ASPARTATE, DEXTROAMPHETAMINE SULFATE AND AMPHETAMINE SULFATE 5; 5; 5; 5 MG/1; MG/1; MG/1; MG/1
20 TABLET ORAL 2 TIMES DAILY
Qty: 60 TABLET | Refills: 0 | OUTPATIENT
Start: 2021-03-11

## 2021-03-11 NOTE — TELEPHONE ENCOUNTER
Denied.  In reviewing Mendy's last note she wanted him seen to recheck this.  Needs vitals checked including heart rate, BP and weight given dose change of his stimulant.  Can do this at a vitals visit and then do E-visit or video visit or can come in-person if he chooses.    Umesh Gruber MD

## 2021-03-11 NOTE — TELEPHONE ENCOUNTER
Outpatient Medication Detail   Disp Refills Start End DENYS   amphetamine-dextroamphetamine (ADDERALL) 20 MG tablet 60 tablet 0 2/12/2021  No   Sig - Route: Take 1 tablet (20 mg) by mouth 2 times daily - Oral     Problem List Complete:    Yes    Last Office Visit with [place] primary care provider: 1/14/2021    Future Office visit:     Controlled substance agreement:   Encounter-Level CSA - 06/08/2016:    Controlled Substance Agreement - Scan on 6/15/2016 11:03 AM: CONTROLLED SUBSTANCE AGREEMENT     Encounter-Level CSA - 12/12/2014:    Controlled Substance Agreement - Scan on 12/23/2014  2:52 PM: Controlled Medication Agreement 12/12/14     Patient-Level CSA:    Controlled Substance Agreement - Non - Opioid - Scan on 1/18/2021  4:37 PM: NON-OPIOID CONTROLLED SUBSTANCE AGREEMENT  Controlled Substance Agreement - Non - Opioid - Scan on 3/4/2019 10:27 AM: NON-OPIOID CONTROLLED SUBSTANCE AGREEMENT         Last Urine Drug Screen: No results found for: JEFE   Comprehen Drug Analysis UR   Date Value Ref Range Status   05/08/2017   Final    FINAL  (Note)  ====================================================================  COMPREHENSIVE DRUG ANALYSIS,UR  ====================================================================  Test                             Result       Flag       Units    Drug Present   Amphetamine                    2102                    ng/mg creat    Amphetamine is available as a schedule II prescription drug.     Lidocaine                      PRESENT  ====================================================================  Test                      Result    Flag   Units      Ref Range   Creatinine              149              mg/dL      >=20  ====================================================================  For clinical consultation, please call [phone].  ====================================================================  Analysis performed by [facility] [place] [address]      ,   Cannabinoids (13-fok-0-carboxy-9-THC)   Date Value Ref Range Status   01/14/2021 Not Detected NDET^Not Detected ng/mL Final     Comment:     Cutoff for a negative cannabinoid is 50 ng/mL or less.     Phencyclidine (Phencyclidine)   Date Value Ref Range Status   01/14/2021 Not Detected NDET^Not Detected ng/mL Final     Comment:     Cutoff for a negative PCP is 25 ng/mL or less.     Cocaine (Benzoylecgonine)   Date Value Ref Range Status   01/14/2021 Not Detected NDET^Not Detected ng/mL Final     Comment:     Cutoff for a negative cocaine is 150 ng/ml or less.     Methamphetamine (d-Methamphetamine)   Date Value Ref Range Status   01/14/2021 Not Detected NDET^Not Detected ng/mL Final     Comment:     Cutoff for a negative methamphetamine is 500 ng/ml or less.     Opiates (Morphine)   Date Value Ref Range Status   01/14/2021 Not Detected NDET^Not Detected ng/mL Final     Comment:     Cutoff for a negative opiate is 100 ng/ml or less.     Amphetamine (d-Amphetamine)   Date Value Ref Range Status   01/14/2021 Detected, Abnormal Result (A) NDET^Not Detected ng/mL Final     Comment:     Cutoff for a positive amphetamine is greater than 500 ng/ml.  This is an unconfirmed screening result to be used for medical purposes only.   Order [id number] for confirmation or individual confirmation tests to [facility].       Benzodiazepines (Nordiazepam)   Date Value Ref Range Status   01/14/2021 Not Detected NDET^Not Detected ng/mL Final     Comment:     Cutoff for a negative benzodiazepine is 150 ng/ml or less.     Tricyclic Antidepressants (Desipramine)   Date Value Ref Range Status   01/14/2021 Not Detected NDET^Not Detected ng/mL Final     Comment:     Cutoff for a negative tricyclic antidepressant is 300 ng/ml or less.     Methadone (Methadone)   Date Value Ref Range Status   01/14/2021 Not Detected NDET^Not Detected ng/mL Final     Comment:     Cutoff for a negative methadone is 200 ng/ml or less.     Barbiturates (Butalbital)    Date Value Ref Range Status   01/14/2021 Not Detected NDET^Not Detected ng/mL Final     Comment:     Cutoff for a negative barbituate is 200 ng/ml or less.     Oxycodone (Oxycodone)   Date Value Ref Range Status   01/14/2021 Not Detected NDET^Not Detected ng/mL Final     Comment:     Cutoff for a negative Oxycodone is 100 ng/mL or less.     Propoxyphene (Norpropoxyphene)   Date Value Ref Range Status   01/14/2021 Not Detected NDET^Not Detected ng/mL Final     Comment:     Cutoff for a negative propoxyphene is 300 ng/ml or less     Buprenorphine (Buprenorphine)   Date Value Ref Range Status   01/14/2021 Not Detected NDET^Not Detected ng/mL Final     Comment:     Cutoff for a negative buprenorphine is 10 ng/ml or less        Processing:  Rx to be electronically transmitted to pharmacy by provider     [email or web address]    Routing refill request to provider for review/approval because:  Drug not on the [facility] refill protocol         Tyra Whitten RN  [facility]

## 2021-03-15 ENCOUNTER — MYC REFILL (OUTPATIENT)
Dept: FAMILY MEDICINE | Facility: CLINIC | Age: 41
End: 2021-03-15

## 2021-03-15 DIAGNOSIS — F90.0 ATTENTION DEFICIT HYPERACTIVITY DISORDER (ADHD), PREDOMINANTLY INATTENTIVE TYPE: ICD-10-CM

## 2021-03-15 RX ORDER — DEXTROAMPHETAMINE SACCHARATE, AMPHETAMINE ASPARTATE, DEXTROAMPHETAMINE SULFATE AND AMPHETAMINE SULFATE 5; 5; 5; 5 MG/1; MG/1; MG/1; MG/1
20 TABLET ORAL 2 TIMES DAILY
Qty: 60 TABLET | Refills: 0 | Status: CANCELLED | OUTPATIENT
Start: 2021-03-15

## 2021-03-15 RX ORDER — DEXTROAMPHETAMINE SACCHARATE, AMPHETAMINE ASPARTATE, DEXTROAMPHETAMINE SULFATE AND AMPHETAMINE SULFATE 5; 5; 5; 5 MG/1; MG/1; MG/1; MG/1
20 TABLET ORAL 2 TIMES DAILY
Qty: 60 TABLET | Refills: 0 | Status: SHIPPED | OUTPATIENT
Start: 2021-03-15

## 2021-03-15 NOTE — PROGRESS NOTES
Chart reviewed.  Rx sent to pt's preferred pharmacy.       [id number] - [place], [place] - [address] AT [facility] [address]  [facility] #[id number] - [place] - [address] AT  [address]    Umesh Gruber MD

## 2021-03-15 NOTE — TELEPHONE ENCOUNTER
amphetamine-dextroamphetamine (ADDERALL) 20 MG tablet 60 tablet 0 2/12/2021  No   Sig - Route: Take 1 tablet (20 mg) by mouth 2 times daily - Oral   Sent to pharmacy as: Amphetamine-Dextroamphetamine 20 MG Oral Tablet (ADDERALL)   Class: E-Prescribe       Last office visit: 1/14/2021 with prescribing provider:     Future Office Visit:          Encounter-Level CSA - 06/08/2016:    Controlled Substance Agreement - Scan on 6/15/2016 11:03 AM: CONTROLLED SUBSTANCE AGREEMENT     Encounter-Level CSA - 12/12/2014:    Controlled Substance Agreement - Scan on 12/23/2014  2:52 PM: Controlled Medication Agreement 12/12/14     Patient-Level CSA:    Controlled Substance Agreement - Non - Opioid - Scan on 1/18/2021  4:37 PM: NON-OPIOID CONTROLLED SUBSTANCE AGREEMENT  Controlled Substance Agreement - Non - Opioid - Scan on 3/4/2019 10:27 AM: NON-OPIOID CONTROLLED SUBSTANCE AGREEMENT       Routing refill request to provider for review/approval because:  Drug not on the [facility] refill protocol     Nicci Coon RN, BSN  [facility]

## 2021-03-15 NOTE — TELEPHONE ENCOUNTER
Refilled in separate encounter.  Will see Mendy next week as scheduled.    Chart reviewed.  Rx sent to pt's preferred pharmacy.       [id number] - [place], [place] - [address] AT [facility] [address] [place] [address] [facility] #[id number] - [place] - [address] AT  [address]    Umesh Gruber MD

## 2021-03-19 NOTE — TELEPHONE ENCOUNTER
Attempt #2:  LM to call us back.    Pt refill unable to be filled until apt is made.  Please see instructions below

## 2021-03-24 NOTE — TELEPHONE ENCOUNTER
3/23 was cancelled    Future Appointments   Date Time Provider Department Center   4/6/2021 10:00 AM Mendy Jensen APRN CNP [facility]

## 2021-04-24 ENCOUNTER — HEALTH MAINTENANCE LETTER (OUTPATIENT)
Age: 41
End: 2021-04-24

## 2021-10-03 ENCOUNTER — HEALTH MAINTENANCE LETTER (OUTPATIENT)
Age: 41
End: 2021-10-03

## 2022-03-29 NOTE — LETTER
[facility]  [address] [facility] [address]  Phone: [phone]  Fax: [phone]    April 26, 2017        Carlos Crane  [address] [address] [place] [id number]          To whom it may concern:    RE: Carlos Crane    Patient was seen and treated today at our clinic. Please allow him to return to work, but with restrictions including no use of his R arm until further evaluation with the burn clinic.    Please contact me for questions or concerns.      Sincerely,        Perla Shipley PA-C  
occasional/Yes

## 2022-05-15 ENCOUNTER — HEALTH MAINTENANCE LETTER (OUTPATIENT)
Age: 42
End: 2022-05-15

## 2022-09-10 ENCOUNTER — HEALTH MAINTENANCE LETTER (OUTPATIENT)
Age: 42
End: 2022-09-10

## 2023-06-03 ENCOUNTER — HEALTH MAINTENANCE LETTER (OUTPATIENT)
Age: 43
End: 2023-06-03